# Patient Record
Sex: FEMALE | Race: WHITE
[De-identification: names, ages, dates, MRNs, and addresses within clinical notes are randomized per-mention and may not be internally consistent; named-entity substitution may affect disease eponyms.]

---

## 2020-02-20 ENCOUNTER — HOSPITAL ENCOUNTER (OUTPATIENT)
Dept: HOSPITAL 53 - M SFHCRHEU | Age: 50
End: 2020-02-20
Attending: INTERNAL MEDICINE
Payer: COMMERCIAL

## 2020-02-20 DIAGNOSIS — R53.82: Primary | ICD-10-CM

## 2020-02-20 LAB
25(OH)D3 SERPL-MCNC: 24.3 NG/ML (ref 30–100)
FERRITIN SERPL-MCNC: 58 NG/ML (ref 8–252)
IRON SERPL-MCNC: 71 UG/DL (ref 50–170)
T4 FREE SERPL-MCNC: 0.93 NG/DL (ref 0.76–1.46)
TSH SERPL DL<=0.005 MIU/L-ACNC: 1.22 UIU/ML (ref 0.36–3.74)

## 2020-02-20 PROCEDURE — 82306 VITAMIN D 25 HYDROXY: CPT

## 2020-02-20 PROCEDURE — 84443 ASSAY THYROID STIM HORMONE: CPT

## 2020-02-20 PROCEDURE — 84439 ASSAY OF FREE THYROXINE: CPT

## 2020-02-20 PROCEDURE — 36415 COLL VENOUS BLD VENIPUNCTURE: CPT

## 2020-02-20 PROCEDURE — 83540 ASSAY OF IRON: CPT

## 2020-02-20 PROCEDURE — 82728 ASSAY OF FERRITIN: CPT

## 2020-07-07 ENCOUNTER — HOSPITAL ENCOUNTER (INPATIENT)
Dept: HOSPITAL 53 - M ED | Age: 50
LOS: 10 days | Discharge: HOME HEALTH | DRG: 382 | End: 2020-07-17
Attending: SURGERY | Admitting: SURGERY
Payer: COMMERCIAL

## 2020-07-07 VITALS — SYSTOLIC BLOOD PRESSURE: 132 MMHG | DIASTOLIC BLOOD PRESSURE: 82 MMHG

## 2020-07-07 VITALS — BODY MASS INDEX: 17.86 KG/M2 | WEIGHT: 94.58 LBS | HEIGHT: 61 IN

## 2020-07-07 VITALS — SYSTOLIC BLOOD PRESSURE: 119 MMHG | DIASTOLIC BLOOD PRESSURE: 70 MMHG

## 2020-07-07 DIAGNOSIS — K31.1: Primary | ICD-10-CM

## 2020-07-07 DIAGNOSIS — R63.4: ICD-10-CM

## 2020-07-07 DIAGNOSIS — K26.9: ICD-10-CM

## 2020-07-07 DIAGNOSIS — K25.9: ICD-10-CM

## 2020-07-07 DIAGNOSIS — K31.4: ICD-10-CM

## 2020-07-07 DIAGNOSIS — Z87.891: ICD-10-CM

## 2020-07-07 DIAGNOSIS — Z11.59: ICD-10-CM

## 2020-07-07 LAB
ALBUMIN SERPL BCG-MCNC: 4.2 GM/DL (ref 3.2–5.2)
ALT SERPL W P-5'-P-CCNC: 17 U/L (ref 12–78)
BASOPHILS # BLD AUTO: 0 10^3/UL (ref 0–0.2)
BASOPHILS NFR BLD AUTO: 0.3 % (ref 0–1)
BILIRUB CONJ SERPL-MCNC: 0.1 MG/DL (ref 0–0.2)
BILIRUB SERPL-MCNC: 0.5 MG/DL (ref 0.2–1)
EOSINOPHIL # BLD AUTO: 0 10^3/UL (ref 0–0.5)
EOSINOPHIL NFR BLD AUTO: 0.5 % (ref 0–3)
HCT VFR BLD AUTO: 42.9 % (ref 36–47)
HGB BLD-MCNC: 14.3 G/DL (ref 12–15.5)
LIPASE SERPL-CCNC: 123 U/L (ref 73–393)
LYMPHOCYTES # BLD AUTO: 1.6 10^3/UL (ref 1.5–5)
LYMPHOCYTES NFR BLD AUTO: 24.5 % (ref 24–44)
MAGNESIUM SERPL-MCNC: 2.2 MG/DL (ref 1.8–2.4)
MCH RBC QN AUTO: 30.4 PG (ref 27–33)
MCHC RBC AUTO-ENTMCNC: 33.3 G/DL (ref 32–36.5)
MCV RBC AUTO: 91.1 FL (ref 80–96)
MONOCYTES # BLD AUTO: 0.7 10^3/UL (ref 0–0.8)
MONOCYTES NFR BLD AUTO: 11.4 % (ref 0–5)
NEUTROPHILS # BLD AUTO: 4.1 10^3/UL (ref 1.5–8.5)
NEUTROPHILS NFR BLD AUTO: 63.1 % (ref 36–66)
PLATELET # BLD AUTO: 264 10^3/UL (ref 150–450)
PROT SERPL-MCNC: 8 GM/DL (ref 6.4–8.2)
RBC # BLD AUTO: 4.71 10^6/UL (ref 4–5.4)
TSH SERPL DL<=0.005 MIU/L-ACNC: 1.48 UIU/ML (ref 0.36–3.74)
WBC # BLD AUTO: 6.5 10^3/UL (ref 4–10)

## 2020-07-07 RX ADMIN — SODIUM CHLORIDE, SODIUM LACTATE, POTASSIUM CHLORIDE, CALCIUM CHLORIDE AND DEXTROSE MONOHYDRATE SCH MLS/HR: 5; 600; 310; 30; 20 INJECTION, SOLUTION INTRAVENOUS at 16:42

## 2020-07-07 RX ADMIN — DEXTROSE MONOHYDRATE SCH MG: 50 INJECTION, SOLUTION INTRAVENOUS at 20:43

## 2020-07-07 NOTE — REP
REASON FOR EXAM:  Abdominal pain.

 

PRIORS: None.

 

CONTRAST:  100 mL of Isovue 370.

 

The patient states there was a prior CT of the abdomen and pelvis at [facility], however, secondary to the emergent nature of today's examination,

this examination will be interpreted without the prior for comparison.  If and

when the prior becomes available for review an addendum report will be made

necessary.

 

The lung bases are clear.

 

There is gross gastric distention with predominantly fluid but with gas as well.

The duodenum appears extremely narrowed, however, there is some fluid seen distal

to the narrow portion of the duodenum, which again undergoes luminal narrowing at

the level of the duodenal sweep/fourth portion.  There is gas within multiple

small bowel loops with gas and stool seen throughout the colon.  There is a small

amount of free fluid seen in the periduodenal area without evidence of free

periduodenal air.  No free air is seen throughout the abdomen or pelvis.  The

liver, gallbladder, spleen, pancreas, and adrenal glands and kidneys are within

normal limits.  The abdominal aorta and para-aortic regions are within normal

limits.

 

Bone window technique throughout the examination shows the osseous structures to

be within normal limits.

 

IMPRESSION:

 

1.  There is evidence of at least partial gas outlet obstruction. There is

significant duodenal edema and fluid in the periduodenal area.  In addition, the

aorta/SMA interval is very narrowed and compresses both the duodenal sweep and

the left renal vein.  This could, at least, impart be responsible for the

suspected partial gastric outlet obstruction.  In addition, I cannot rule out the

possibility of a duodenal ulcer responsible for the aforementioned findings as

well.

 

2.  Other findings as described above.

 

 

Electronically Signed by

Mariano Gardner DO 07/07/2020 05:28 P

## 2020-07-08 VITALS — DIASTOLIC BLOOD PRESSURE: 67 MMHG | SYSTOLIC BLOOD PRESSURE: 131 MMHG

## 2020-07-08 VITALS — DIASTOLIC BLOOD PRESSURE: 56 MMHG | SYSTOLIC BLOOD PRESSURE: 115 MMHG

## 2020-07-08 VITALS — DIASTOLIC BLOOD PRESSURE: 68 MMHG | SYSTOLIC BLOOD PRESSURE: 111 MMHG

## 2020-07-08 VITALS — DIASTOLIC BLOOD PRESSURE: 58 MMHG | SYSTOLIC BLOOD PRESSURE: 119 MMHG

## 2020-07-08 VITALS — DIASTOLIC BLOOD PRESSURE: 70 MMHG | SYSTOLIC BLOOD PRESSURE: 118 MMHG

## 2020-07-08 VITALS — SYSTOLIC BLOOD PRESSURE: 124 MMHG | DIASTOLIC BLOOD PRESSURE: 60 MMHG

## 2020-07-08 LAB
BASOPHILS # BLD AUTO: 0 10^3/UL (ref 0–0.2)
BASOPHILS NFR BLD AUTO: 0.5 % (ref 0–1)
BUN SERPL-MCNC: 13 MG/DL (ref 7–18)
CALCIUM SERPL-MCNC: 8.5 MG/DL (ref 8.5–10.1)
CHLORIDE SERPL-SCNC: 103 MEQ/L (ref 98–107)
CO2 SERPL-SCNC: 32 MEQ/L (ref 21–32)
CREAT SERPL-MCNC: 0.58 MG/DL (ref 0.55–1.3)
EOSINOPHIL # BLD AUTO: 0.2 10^3/UL (ref 0–0.5)
EOSINOPHIL NFR BLD AUTO: 3.4 % (ref 0–3)
GFR SERPL CREATININE-BSD FRML MDRD: > 60 ML/MIN/{1.73_M2} (ref 51–?)
GLUCOSE SERPL-MCNC: 110 MG/DL (ref 70–100)
HCT VFR BLD AUTO: 35.6 % (ref 36–47)
HGB BLD-MCNC: 11.8 G/DL (ref 12–15.5)
LYMPHOCYTES # BLD AUTO: 1.6 10^3/UL (ref 1.5–5)
LYMPHOCYTES NFR BLD AUTO: 35.2 % (ref 24–44)
MCH RBC QN AUTO: 30.5 PG (ref 27–33)
MCHC RBC AUTO-ENTMCNC: 33.1 G/DL (ref 32–36.5)
MCV RBC AUTO: 92 FL (ref 80–96)
MONOCYTES # BLD AUTO: 0.5 10^3/UL (ref 0–0.8)
MONOCYTES NFR BLD AUTO: 10.6 % (ref 0–5)
NEUTROPHILS # BLD AUTO: 2.2 10^3/UL (ref 1.5–8.5)
NEUTROPHILS NFR BLD AUTO: 50.1 % (ref 36–66)
PLATELET # BLD AUTO: 216 10^3/UL (ref 150–450)
POTASSIUM SERPL-SCNC: 2.9 MEQ/L (ref 3.5–5.1)
RBC # BLD AUTO: 3.87 10^6/UL (ref 4–5.4)
SODIUM SERPL-SCNC: 140 MEQ/L (ref 136–145)
WBC # BLD AUTO: 4.4 10^3/UL (ref 4–10)

## 2020-07-08 RX ADMIN — SODIUM CHLORIDE, SODIUM LACTATE, POTASSIUM CHLORIDE, CALCIUM CHLORIDE AND DEXTROSE MONOHYDRATE SCH MLS/HR: 5; 600; 310; 30; 20 INJECTION, SOLUTION INTRAVENOUS at 00:21

## 2020-07-08 RX ADMIN — SODIUM CHLORIDE, SODIUM LACTATE, POTASSIUM CHLORIDE, CALCIUM CHLORIDE AND DEXTROSE MONOHYDRATE SCH MLS/HR: 5; 600; 310; 30; 20 INJECTION, SOLUTION INTRAVENOUS at 08:33

## 2020-07-08 RX ADMIN — DEXTROSE MONOHYDRATE SCH MLS/HR: 50 INJECTION, SOLUTION INTRAVENOUS at 16:43

## 2020-07-08 RX ADMIN — DEXTROSE MONOHYDRATE SCH MG: 50 INJECTION, SOLUTION INTRAVENOUS at 08:33

## 2020-07-08 RX ADMIN — DEXTROSE MONOHYDRATE SCH MG: 50 INJECTION, SOLUTION INTRAVENOUS at 21:44

## 2020-07-08 NOTE — REP
CHEST:

 

Two AP views of the chest are performed.

 

There is a nasogastric tube.  The sideport is at the gastroesophageal junction.

The tube should be advanced.

 

Lungs are free of infiltrate.  Heart and mediastinum are unremarkable.

 

 

Electronically Signed by

Darnell Bee MD 07/09/2020 09:12 A

## 2020-07-09 VITALS — SYSTOLIC BLOOD PRESSURE: 113 MMHG | DIASTOLIC BLOOD PRESSURE: 70 MMHG

## 2020-07-09 VITALS — DIASTOLIC BLOOD PRESSURE: 56 MMHG | SYSTOLIC BLOOD PRESSURE: 108 MMHG

## 2020-07-09 VITALS — DIASTOLIC BLOOD PRESSURE: 72 MMHG | SYSTOLIC BLOOD PRESSURE: 152 MMHG

## 2020-07-09 LAB
BUN SERPL-MCNC: 8 MG/DL (ref 7–18)
CALCIUM SERPL-MCNC: 8.6 MG/DL (ref 8.5–10.1)
CHLORIDE SERPL-SCNC: 110 MEQ/L (ref 98–107)
CO2 SERPL-SCNC: 31 MEQ/L (ref 21–32)
CREAT SERPL-MCNC: 0.52 MG/DL (ref 0.55–1.3)
GFR SERPL CREATININE-BSD FRML MDRD: > 60 ML/MIN/{1.73_M2} (ref 51–?)
GLUCOSE SERPL-MCNC: 74 MG/DL (ref 70–100)
POTASSIUM SERPL-SCNC: 3.8 MEQ/L (ref 3.5–5.1)
SODIUM SERPL-SCNC: 144 MEQ/L (ref 136–145)

## 2020-07-09 PROCEDURE — 0DJ08ZZ INSPECTION OF UPPER INTESTINAL TRACT, VIA NATURAL OR ARTIFICIAL OPENING ENDOSCOPIC: ICD-10-PCS | Performed by: SURGERY

## 2020-07-09 PROCEDURE — 02HV33Z INSERTION OF INFUSION DEVICE INTO SUPERIOR VENA CAVA, PERCUTANEOUS APPROACH: ICD-10-PCS | Performed by: RADIOLOGY

## 2020-07-09 PROCEDURE — 3E0436Z INTRODUCTION OF NUTRITIONAL SUBSTANCE INTO CENTRAL VEIN, PERCUTANEOUS APPROACH: ICD-10-PCS | Performed by: SURGERY

## 2020-07-09 RX ADMIN — DEXTROSE MONOHYDRATE SCH MLS/HR: 50 INJECTION, SOLUTION INTRAVENOUS at 05:10

## 2020-07-09 RX ADMIN — SODIUM CHLORIDE SCH ML: 9 INJECTION, SOLUTION INTRAMUSCULAR; INTRAVENOUS; SUBCUTANEOUS at 19:03

## 2020-07-09 RX ADMIN — DEXTROSE MONOHYDRATE SCH MLS/HR: 50 INJECTION, SOLUTION INTRAVENOUS at 16:10

## 2020-07-09 RX ADMIN — Medication PRN UNITS: at 21:08

## 2020-07-09 RX ADMIN — SODIUM CHLORIDE PRN ML: 9 INJECTION, SOLUTION INTRAMUSCULAR; INTRAVENOUS; SUBCUTANEOUS at 21:08

## 2020-07-09 RX ADMIN — DEXTROSE MONOHYDRATE SCH MG: 50 INJECTION, SOLUTION INTRAVENOUS at 08:39

## 2020-07-09 RX ADMIN — Medication SCH UNITS: at 19:03

## 2020-07-09 RX ADMIN — DEXTROSE MONOHYDRATE SCH MG: 50 INJECTION, SOLUTION INTRAVENOUS at 21:08

## 2020-07-09 NOTE — HPE
DATE OF ADMISSION:  07/07/2020

 

ADMITTING DIAGNOSIS:  Gastric outlet obstruction.

 

HISTORY OF THE PRESENT ILLNESS:  The patient is a very pleasant 50-year-old 
woman

who presented to the emergency department at 8:54 a.m. on 07/07/2020 complaining

of persistent nausea and vomiting with an inability to take oral intake and loss

of weight.  She reported that she had been having problems, particularly since

March of 2020.  She does report that she had some problems similar in nature 
back

in 2016.  She reports that since March, her problems have worsened.  She has had

progressively worsening difficulty with food coming back up after meals.  She

reports crampy abdominal pains.  She has been having some bowel function, but

this is diminished.  She reports that she was seen at [facility] on

06/08/2020 with complaints of dizziness and weakness.  She was then seen again 
at

[facility] on 06/27/2020.  She reports that in February, she weighed

102 pounds, and her weight is currently down to about 88 pounds.  She reports

that she will eat, and then she will have this sensation of bubbling or churning

in the abdomen with cramps, and eventually she will vomit back up most if not 
all

of what she has eaten.  She had been taking some over-the-counter Pepcid.  She

was recently provided some Reglan and Carafate by either the [facility]

[facility] or her primary physician, Dr. Arango.  She reports that she had been

requesting a referral to gastroenterology, but this had not yet been

accomplished.  In the emergency department at [facility], she was evaluated with

some laboratory work and also had a CT scan of the abdomen and pelvis obtained.

The CT scan revealed a distended and enlarged stomach.  The radiologist reported

evidence for narrowing of the duodenum.  He did report that there was some gas

within some small bowel loops and stool in the colon.  The patient had a

nasogastric tube inserted in the emergency department, and she is now admitted

for further evaluation and management of her apparent gastric outlet

obstruction.

 

ALLERGIES:  The patient reports no known drug allergies..

 

MEDICATIONS:  Her current listed medications include only some acetaminophen on

an as-needed basis.  She had been provided previously with some Reglan and

promethazine and Carafate but indicates that at the time of her admission, she

has not actually been taking these.  She had also occasionally taken some

over-the-counter Pepcid.

 

SURGICAL HISTORY:  Is significant for a tubal ligation back in 1997.

 

MEDICAL HISTORY:  The patient had undergone evaluation for some positive

rheumatologic tests in February.  Apparently, the tests pointed towards

scleroderma, but the rheumatologist did not feel this was the case.  She has no

other active medical issues beyond her gastrointestinal (GI) problem.

 

SOCIAL HISTORY:  The patient has been helping care for her mother.  She is

 and is a  dependent.  She is a former smoker and reports that 
she

quit any tobacco use a month ago.  She denies any alcohol intake.

 

REVIEW OF SYSTEMS:  Shows no history of chest pain or palpitations.  She has no

respiratory symptoms.  She has no history of deep venous thrombosis (DVT) or

pulmonary embolus.  She denies any dysuria or hematuria or renal problems.  She

has no bone or joint issues.  She has not noticed any rectal bleeding or

hematemesis.  She denies any history of hepatitis, pancreatitis, or jaundice.

 

FAMILY HISTORY:  The patient reports that her mother has had stomach issues,

though these are not defined.

 

PHYSICAL EXAMINATION:

Reveals a very thin pleasant woman sitting up in the stretcher.

She is alert and oriented.

Most recent vital signs are reviewed.

She is not tachycardiac, and her blood pressure is good.

The patient does become emotional when describing her persistent problems and 
her

inability so far to have obtained an answer to why she is having so many

problems.

Skin:  Is warm and dry.

Sclerae are anicteric.  Mucous membranes are moist.

The neck is supple without mass.  She has no supraclavicular adenopathy.

Heart examination shows a regular rate and rhythm.

The lungs are clear.

The abdomen is flat to perhaps mildly protuberant.  She does have bowel sounds

present.  She has some mild tenderness in the right upper quadrant area.  There

is no mass appreciated.  There is no hernia identified.

Extremities are thin with palpable radial and pedal pulses and no edema.

 

LABORATORY STUDIES:

Include a complete blood count (CBC) showing a white count of 6, hemoglobin of

14, hematocrit 43, and platelet count of 264,000.  Differential count shows 63%

neutrophils, 24% lymphocytes, and 11% monocytes.

 

Chemistry profile included a point of care chemistry profile showing a glucose 
of

113, potassium 2.9, chloride of 89, with a BUN of 24, and a creatinine of 0.9.

Her liver function tests were all normal with a normal magnesium.  Albumin is 
4.2

with a protein of 8, and her lipase is normal at 123.  She had a TSH level done

that was 1.48.

 

Her CT scan images and report I reviewed personally.  She definitely has a

markedly enlarged stomach.  The course of the duodenum past the duodenal bulb is

not entirely clear to me on reviewing her CAT scan, as this is not outlined by

contrast or air.  She does have some air and fluid more distally in the small

bowel.

 

IMPRESSION:  Is gastric outlet obstruction with 3-4 months of progressive

symptoms.  She has had a weight loss of approximately 15 pounds over this period

of time.

 

PLAN:  The patient was counseled that she clearly has a blockage of the outlet 
of

her stomach, and this very nicely explains her symptoms.  I advised her that the

probably most common cause for a gastric outlet obstruction would be severe

peptic ulcer disease.  It is also possible that this could be caused by a

malignant lesion, either of the stomach or duodenum or pancreas, although there

was no mass identified by CT.  I have recommended that we admit her to treat her

for her inability to tolerate any oral intake at this point but also try to

identify the cause of her obstruction.  The NG tube will be continued to low

intermittent suction at this point.  I will continue her on some IV fluids.  If

it is clear that her obstruction persists despite treatment, then initiation of

total parenteral nutrition would be appropriate.  I advised her that we will 
need

to perform an upper endoscopy within the next day or two to try to diagnose the

etiology of her obstruction.  She will be started on Protonix 40 mg IV twice

daily.  She does not require any antibiotics at this time.  We will recheck her

laboratories in the morning.  She had an opportunity to ask questions.  She is

agreeable with the plan as I have outlined it.

[facility]

## 2020-07-09 NOTE — REPVR
PROCEDURE INFORMATION: 

Exam: MR Abdomen Without and With Contrast 

Exam date and time: 7/9/2020 6:36 PM 

Age: 50 years old 

Clinical indication: Abdominal tenderness and bloating and constipation and 

mass, lump, or swelling and nausea and vomiting; Epigastric; Patient HX: Pain 

nausea vomiting diarrhea, attn pancreas; Additional info: Gastric outlet 

obstruction, evaluate for tumor 



TECHNIQUE: 

Imaging protocol: MR of the abdomen without and with intravenous contrast. 

Contrast material: PROHANCE; Contrast volume: 8 ml; Contrast route: INTRAVENOUS 

(IV);  



COMPARISON: 

CT ABD/PEL W/IV CONTRAST ONLY 7/7/2020 10:38 AM 



FINDINGS: 

Liver: The liver is normal. 

Gallbladder and bile ducts: The gallbladder is normal.No calcified calculi. 

Normal bile ducts. 

Pancreas: The pancreas is normal. 

Spleen: The spleen is normal. 

Adrenals: The adrenals are normal. 

Kidneys and ureters: The kidneys are normal.No hydronephrosis. 

Stomach and bowel: The stomach is distended and contains debris and fluid. 

There is a 4 cm length of the descending duodenum which is persistently 

circumferentially thick-walled. Series 301 images 10-12 series 501 images 

19-21. Also seen on series 602, images 18 to 21. 

Intraperitoneal space: Unremarkable

 Arteries: No abdominal aortic aneurysm. 

Bones/joints: Unremarkable for

Soft tissues: Unremarkable. 



IMPRESSION: 

Gastric distension. The descending duodenum appears persistently thick-walled 

on multiple sequences. This could represent inflammation or neoplasm. 



Electronically signed by: Memo Chris On 07/09/2020  20:58:07 PM

## 2020-07-09 NOTE — IPN
DATE OF SERVICE:  07/08/2020

 

HISTORY:  The patient was admitted yesterday with a several-month history 
leading

up to a gastric outlet obstruction diagnosis.  She has been unable to keep down

any food for quite some time and has lost 13-18 pounds.  She was admitted with a

nasogastric (NG) tube and intravenous (IV) hydration.  She initially drained a

fairly large amount of fluid from the tube but overnight has had minimal output.

She reports she has had a small amount of flatus, and she denies any abdominal

pain currently.

 

VITAL SIGNS:  Show that she has been afebrile.  Her pulse is in the low to mid

70s, and blood pressure is good.

 

INTAKE AND OUTPUT:  Showed that she has had a good urine output this morning.

Her NG tube has had only 25 mL recorded out today.

 

PHYSICAL EXAMINATION:

The patient is sitting up in the bed dabbing at her nose because of some 
drainage

around the NG tube.  This appears to be just local irritation.

She is alert and oriented and does complain of some throat discomfort from the 
NG

tube.

Heart examination shows a regular rhythm.

Her lungs are clear.

The abdomen shows active bowel sounds and is soft and without any tenderness.

 

LABORATORY STUDIES:

Today show a white count of 4000, hemoglobin 12, hematocrit 36, and a platelet

count of 216,000.  Differential count is normal.

 

Her chemistry profile shows a potassium of 2.9, which is unchanged from

yesterday.  Her other electrolytes are normal.

 

IMPRESSION:  The patient has a history and CT scan consistent with gastric 
outlet

obstruction.  The etiology is unknown.  The CT scan showed no evidence of mass.

 

PLAN:  The patient will be continued on her twice-daily Protonix IV.  Her NG 
tube

will be discontinued, as it has had minimal out.  She will remain nothing by

mouth.  She is scheduled for an upper endoscopy tomorrow to try to diagnose the

etiology of her obstruction.  Additional potassium will be placed in her IV

fluid, and the rate will be decreased.  A COVID-19 test will be obtained

preoperatively.

YONATHAND

## 2020-07-09 NOTE — ROOR
________________________________________________________________________________

Patient Name: Sade Sebastian             Procedure Date: 7/9/2020 10:11 AM

MRN: [id number]                          Account Number: [id number]

YOB: 1970                Age: 50

Room: Main OR                          Gender: Female

Note Status: Finalized                 

________________________________________________________________________________

 

Procedure:           Upper GI endoscopy

Indications:         Gastric outlet obstruction

Providers:           Morales Jameson MD

Referring MD:        2. Inpatient 2. Inpatient

Requesting Provider: 

Medicines:           Monitored Anesthesia Care

Complications:       No immediate complications.

________________________________________________________________________________

Procedure:           Pre-Anesthesia Assessment:

                     - Prior to the procedure, a History and Physical was 

                     performed, and patient medications and allergies were 

                     reviewed. The patient is competent. The risks and 

                     benefits of the procedure and the sedation options and 

                     risks were discussed with the patient. All questions were 

                     answered and informed consent was obtained. Patient 

                     identification and proposed procedure were verified by 

                     the physician, the nurse and the anesthetist in the 

                     procedure room. Mental Status Examination: alert and 

                     oriented. Airway Examination: normal oropharyngeal airway 

                     and neck mobility. Prophylactic Antibiotics: The patient 

                     does not require prophylactic antibiotics. Prior 

                     Anticoagulants: The patient has taken no previous 

                     anticoagulant or antiplatelet agents. ASA Grade 

                     Assessment: II - A patient with mild systemic disease. 

                     After reviewing the risks and benefits, the patient was 

                     deemed in satisfactory condition to undergo the 

                     procedure. The anesthesia plan was to use monitored 

                     anesthesia care (MAC). Immediately prior to 

                     administration of medications, the patient was 

                     re-assessed for adequacy to receive sedatives. The heart 

                     rate, respiratory rate, oxygen saturations, blood 

                     pressure, adequacy of pulmonary ventilation, and response 

                     to care were monitored throughout the procedure. The 

                     physical status of the patient was re-assessed after the 

                     procedure.

                     The Endoscope was introduced through the mouth, and 

                     advanced to the pylorus. The upper GI endoscopy was 

                     accomplished without difficulty. The patient tolerated 

                     the procedure well.

                                                                                

Findings:

     The examined esophagus was normal.

     A medium amount of food (residue) was found on the greater curvature of 

     the stomach.

     A benign-appearing, intrinsic severe stenosis was found at the pylorus. 

     This was non-traversed. There appeared to be a pinpint opening. The 

     mucosa extending to thie point appeared normal.

     One non-bleeding linear gastric ulcer with no stigmata of bleeding was 

     found in the prepyloric region of the stomach. The lesion was 7 mm in 

     largest dimension. There was some deformity of this area.

                                                                                

Impression:          - Normal esophagus.

                     - A medium amount of food (residue) in the stomach.

                     - Gastric stenosis was found at the pylorus.

                     - Non-bleeding gastric ulcer with no stigmata of bleeding.

                     - No specimens collected.

Recommendation:      - Return patient to [facility] for ongoing care.

                     - NPO.

                     - Perform magnetic resonance imaging (MRI) with 

                     gadolinium at appointment to be scheduled.

                                                                                

 

Morales Jameson MD

__________________

Morales Jameson MD

7/9/2020 12:31:07 PM

Electronically signed by Morales Jameson MD

Number of Addenda: 0

 

Note Initiated On: 7/9/2020 10:11 AM

Estimated Blood Loss:

     Estimated blood loss: none.

## 2020-07-10 VITALS — DIASTOLIC BLOOD PRESSURE: 64 MMHG | SYSTOLIC BLOOD PRESSURE: 127 MMHG

## 2020-07-10 VITALS — SYSTOLIC BLOOD PRESSURE: 129 MMHG | DIASTOLIC BLOOD PRESSURE: 81 MMHG

## 2020-07-10 VITALS — DIASTOLIC BLOOD PRESSURE: 83 MMHG | SYSTOLIC BLOOD PRESSURE: 124 MMHG

## 2020-07-10 VITALS — SYSTOLIC BLOOD PRESSURE: 126 MMHG | DIASTOLIC BLOOD PRESSURE: 80 MMHG

## 2020-07-10 VITALS — SYSTOLIC BLOOD PRESSURE: 132 MMHG | DIASTOLIC BLOOD PRESSURE: 85 MMHG

## 2020-07-10 VITALS — SYSTOLIC BLOOD PRESSURE: 122 MMHG | DIASTOLIC BLOOD PRESSURE: 83 MMHG

## 2020-07-10 LAB — CANCER AG19-9 SERPL-ACNC: 8.9 U/ML (ref ?–35)

## 2020-07-10 RX ADMIN — SODIUM CHLORIDE SCH ML: 9 INJECTION, SOLUTION INTRAMUSCULAR; INTRAVENOUS; SUBCUTANEOUS at 19:03

## 2020-07-10 RX ADMIN — Medication PRN UNITS: at 08:53

## 2020-07-10 RX ADMIN — SODIUM CHLORIDE PRN ML: 9 INJECTION, SOLUTION INTRAMUSCULAR; INTRAVENOUS; SUBCUTANEOUS at 08:53

## 2020-07-10 RX ADMIN — Medication SCH UNITS: at 05:43

## 2020-07-10 RX ADMIN — SODIUM CHLORIDE SCH ML: 9 INJECTION, SOLUTION INTRAMUSCULAR; INTRAVENOUS; SUBCUTANEOUS at 05:43

## 2020-07-10 RX ADMIN — Medication SCH UNITS: at 19:03

## 2020-07-10 RX ADMIN — DEXTROSE MONOHYDRATE SCH MG: 50 INJECTION, SOLUTION INTRAVENOUS at 08:44

## 2020-07-10 RX ADMIN — DEXTROSE MONOHYDRATE SCH MG: 50 INJECTION, SOLUTION INTRAVENOUS at 20:13

## 2020-07-10 NOTE — IPN
DATE: 07/09/2020

 

HISTORY:

The patient was admitted on July 7th with a history and CT scan consistent with

gastric outlet obstruction.  She had an upper endoscopy earlier today which 
shows

perhaps a pinpoint opening at the pylorus with evidence of some deformity and an

ulcer just proximal to the pinpoint opening seen.  She had some retained food

products in the stomach.

 

Vital signs show that she has remained afebrile over the last 24 hours.  Her

pulse is in the 60s to low 80s.  Blood pressure is good and her room air

oxygenation is normal.

 

Intake and output show that she had 1230 recorded in yesterday, though this is

less than what would be expected from her IV fluids.  Her urine output yesterday

was 550 and her urine output has increased today.

 

PHYSICAL EXAMINATION:

The patient has been alert and oriented.  She tolerated her upper endoscopy

well.

Heart exam shows a regular rate and rhythm.

The lungs are clear.

The abdomen is flat.  She does have active bowel sounds.  The abdomen is soft 
and

without significant tenderness.

 

LABORATORY FINDINGS:

The patient had a med profile after her endoscopy and this shows a sodium of 
144,

potassium 3.8, chloride 110, CO2 of 31, BUN of 8, creatinine 0.5 and a glucose 
of

74.  She has a CA 19-9 and a gastrin level that are both pending.

 

IMPRESSION:

Gastric outlet obstruction likely secondary to longstanding ulcer disease, 
though

tumor has not been definitively ruled out.

 

PLAN:

The patient will have a peripherally inserted central catheter (PICC) line 
placed

and she will be started on intravenous nutrition.  An MRI will be obtained to

further evaluate the pancreas and duodenum to look for any evidence of

malignancy.  The gastrin and CA 19-9 were ordered.  I anticipate she will come 
to

surgery based on her gastric outlet obstruction.

RICHARD

## 2020-07-11 VITALS — SYSTOLIC BLOOD PRESSURE: 119 MMHG | DIASTOLIC BLOOD PRESSURE: 81 MMHG

## 2020-07-11 VITALS — SYSTOLIC BLOOD PRESSURE: 115 MMHG | DIASTOLIC BLOOD PRESSURE: 74 MMHG

## 2020-07-11 VITALS — DIASTOLIC BLOOD PRESSURE: 68 MMHG | SYSTOLIC BLOOD PRESSURE: 118 MMHG

## 2020-07-11 VITALS — SYSTOLIC BLOOD PRESSURE: 113 MMHG | DIASTOLIC BLOOD PRESSURE: 70 MMHG

## 2020-07-11 VITALS — DIASTOLIC BLOOD PRESSURE: 70 MMHG | SYSTOLIC BLOOD PRESSURE: 112 MMHG

## 2020-07-11 VITALS — DIASTOLIC BLOOD PRESSURE: 80 MMHG | SYSTOLIC BLOOD PRESSURE: 116 MMHG

## 2020-07-11 LAB
ALBUMIN SERPL BCG-MCNC: 3.1 GM/DL (ref 3.2–5.2)
ALT SERPL W P-5'-P-CCNC: 15 U/L (ref 12–78)
BILIRUB SERPL-MCNC: 0.4 MG/DL (ref 0.2–1)
BUN SERPL-MCNC: 13 MG/DL (ref 7–18)
CALCIUM SERPL-MCNC: 8.4 MG/DL (ref 8.5–10.1)
CHLORIDE SERPL-SCNC: 107 MEQ/L (ref 98–107)
CO2 SERPL-SCNC: 28 MEQ/L (ref 21–32)
CREAT SERPL-MCNC: 0.48 MG/DL (ref 0.55–1.3)
GFR SERPL CREATININE-BSD FRML MDRD: > 60 ML/MIN/{1.73_M2} (ref 51–?)
GLUCOSE SERPL-MCNC: 97 MG/DL (ref 70–100)
POTASSIUM SERPL-SCNC: 3.6 MEQ/L (ref 3.5–5.1)
PROT SERPL-MCNC: 6.2 GM/DL (ref 6.4–8.2)
SODIUM SERPL-SCNC: 142 MEQ/L (ref 136–145)

## 2020-07-11 RX ADMIN — DEXTROSE MONOHYDRATE SCH MG: 50 INJECTION, SOLUTION INTRAVENOUS at 19:58

## 2020-07-11 RX ADMIN — SODIUM CHLORIDE SCH ML: 9 INJECTION, SOLUTION INTRAMUSCULAR; INTRAVENOUS; SUBCUTANEOUS at 17:51

## 2020-07-11 RX ADMIN — Medication PRN UNITS: at 08:15

## 2020-07-11 RX ADMIN — SODIUM CHLORIDE SCH ML: 9 INJECTION, SOLUTION INTRAMUSCULAR; INTRAVENOUS; SUBCUTANEOUS at 05:43

## 2020-07-11 RX ADMIN — DEXTROSE MONOHYDRATE SCH MG: 50 INJECTION, SOLUTION INTRAVENOUS at 08:14

## 2020-07-11 RX ADMIN — Medication SCH UNITS: at 05:43

## 2020-07-11 RX ADMIN — SODIUM CHLORIDE PRN ML: 9 INJECTION, SOLUTION INTRAMUSCULAR; INTRAVENOUS; SUBCUTANEOUS at 08:15

## 2020-07-11 RX ADMIN — Medication SCH UNITS: at 17:51

## 2020-07-11 NOTE — IPN
DATE:  07/10/2020

 

HISTORY:  The patient was admitted on 07/07/2020 with evidence for a gastric

outlet obstruction.  Her esophagogastroduodenoscopy (EGD) showed a pinhole

opening perhaps into the duodenum.  She had a followup MRI, which reported no

evidence of pancreatic mass.  She was seen to have a distended stomach with a 4

cm length of the second portion of the duodenum persistently circumferentially

thick-walled.  There was no concerning adenopathy.  The patient has been kept

nothing by mouth and has not had any emesis or complaints of abdominal pain.  
She

was started on total parenteral nutrition (TPN) yesterday as well.

 

Vital signs show that she has been afebrile.  Her pulse has been in the 60s to

about 90.  Blood pressure is good and her oxygen saturations are normal.

 

Intake and output shows that yesterday she had 1300 recorded in with 1000 of

urine out.

 

PHYSICAL EXAMINATION:  The patient is sitting upright in her bed looking quite

comfortable.  She is actually fairly cheerful.  Abdomen is thin and flat.  She

has positive bowel sounds.

 

Laboratory studies from yesterday included a CA 19-9 that was normal at 8.9.  A

gastrin level was drawn and is pending.

 

IMPRESSION:

The patient has a gastric outlet obstruction, which I suspect strongly is 
related

to longstanding peptic ulcer disease.

 

PLAN:

She will continue TPN and proton pump inhibitors twice daily.  She will be kept

nothing by mouth as she appears to be tolerating her own secretions at least.

She does report some flatus.  I have recommended that on Monday we obtain a

gastrograffin swallow to see if we can document some flow through her duodenum,

which might suggest a slight improvement.  If she has not improved and the

swallow shows no significant passage of contrast, then I think surgery on

Tuesday, 07/14/2020, would be warranted.

[facility]

## 2020-07-12 VITALS — SYSTOLIC BLOOD PRESSURE: 107 MMHG | DIASTOLIC BLOOD PRESSURE: 73 MMHG

## 2020-07-12 VITALS — DIASTOLIC BLOOD PRESSURE: 73 MMHG | SYSTOLIC BLOOD PRESSURE: 108 MMHG

## 2020-07-12 VITALS — SYSTOLIC BLOOD PRESSURE: 108 MMHG | DIASTOLIC BLOOD PRESSURE: 73 MMHG

## 2020-07-12 VITALS — SYSTOLIC BLOOD PRESSURE: 108 MMHG | DIASTOLIC BLOOD PRESSURE: 67 MMHG

## 2020-07-12 VITALS — SYSTOLIC BLOOD PRESSURE: 105 MMHG | DIASTOLIC BLOOD PRESSURE: 67 MMHG

## 2020-07-12 VITALS — DIASTOLIC BLOOD PRESSURE: 74 MMHG | SYSTOLIC BLOOD PRESSURE: 109 MMHG

## 2020-07-12 RX ADMIN — Medication PRN UNITS: at 08:03

## 2020-07-12 RX ADMIN — SODIUM CHLORIDE SCH ML: 9 INJECTION, SOLUTION INTRAMUSCULAR; INTRAVENOUS; SUBCUTANEOUS at 06:08

## 2020-07-12 RX ADMIN — Medication PRN UNITS: at 20:19

## 2020-07-12 RX ADMIN — SODIUM CHLORIDE PRN ML: 9 INJECTION, SOLUTION INTRAMUSCULAR; INTRAVENOUS; SUBCUTANEOUS at 20:19

## 2020-07-12 RX ADMIN — Medication SCH UNITS: at 18:26

## 2020-07-12 RX ADMIN — DEXTROSE MONOHYDRATE SCH MG: 50 INJECTION, SOLUTION INTRAVENOUS at 08:02

## 2020-07-12 RX ADMIN — Medication SCH UNITS: at 06:08

## 2020-07-12 RX ADMIN — DEXTROSE MONOHYDRATE SCH MG: 50 INJECTION, SOLUTION INTRAVENOUS at 20:18

## 2020-07-12 RX ADMIN — SODIUM CHLORIDE PRN ML: 9 INJECTION, SOLUTION INTRAMUSCULAR; INTRAVENOUS; SUBCUTANEOUS at 08:03

## 2020-07-12 RX ADMIN — SODIUM CHLORIDE SCH ML: 9 INJECTION, SOLUTION INTRAMUSCULAR; INTRAVENOUS; SUBCUTANEOUS at 18:26

## 2020-07-12 NOTE — IPN
DATE:  07/12/2020

 

 

 

HISTORY:  The patient is being treated for gastric outlet obstruction.  She had

presented on the 7th with several months of worsening crampy abdominal pain with

postprandial vomiting and weight loss.  She is currently on total parenteral

nutrition and twice daily IV Protonix.  She is tolerating her own secretions with

no vomiting or abdominal pain while nothing by mouth.

 

Vital signs show that her temperature has been afebrile.  Her pulse is in the 80s

generally and her blood pressure is good.

 

Intake and output show that yesterday she had 1920 recorded in with [id number]

recorded out.

 

PHYSICAL EXAMINATION:  The patient is alert and very pleasant.  She denies any

pain.  Skin is warm and dry.  Heart exam shows a regular rhythm.  The abdomen is

thin and flat and without any tenderness.  She has not had any further labs

today.

 

IMPRESSION:  The patient is doing well and tolerating her own secretions while

nothing by mouth.

 

PLAN:  We will obtain a gastrograph and swallow tomorrow morning to try to better

estimate whether she might tolerate a clear liquid diet or not.  If this shows

persistent obstruction then surgical intervention is tentatively scheduled for

Tuesday the 14th.  If there is some flow into the duodenum then repeat endoscopy

and consideration of dilation may be appropriate.

## 2020-07-12 NOTE — IPN
DATE:  07/11/2020

 

HISTORY:  The patient was admitted on 07/07/2020 with a gastric outlet

obstruction.  Upper endoscopy revealed a pinpoint opening out of the stomach with

some superficial ulceration in the distal stomach or pylorus.  An MRI showed no

evidence of mass, but persistent narrowing of the descending duodenum.  A CA 19-9

is normal and her gastrin level ordered on 07/09/2020 has not returned elevated

at 175.  She is currently nothing by mouth and on total parenteral nutrition.

She reports that she feels fine with no pain at all and she has had no nausea or

vomiting while nothing by mouth.

 

Vital signs show that she is afebrile with a pulse in the 80s and a normal blood

pressure.  Room air oxygen saturation is normal.

 

Intake and output shows that her total parenteral nutrition intake is not

recorded.  Her urine output was brisk at 3100 mL on 07/10/2020.  She does report

passage of flatus but has not had a bowel movement.

 

PHYSICAL EXAMINATION:  The patient is actually quite perky and pleasant today.

Heart and lung exam is unremarkable.  The abdomen is thin and flat.  She has

active bowel sounds.  There is no tenderness.

 

LABORATORY STUDIES:  The patient had a chemistry profile today.  Her sodium was

142 with a potassium 3.6.  Chloride 107, CO2 is 28, BUN 13, creatinine 0.48, and

a glucose of 97.  Her total protein is 6.2 with an albumin of 3.1.  As noted, her

gastrin was 175 with a normal reported range of zero to 115.

 

IMPRESSION:  The patient has a gastric outlet obstruction at the level of the

duodenum.  She has had several months of symptoms culminating her admission.  Her

CT and MRI do not show any pancreatic pathology, only thickening of her duodenum.

Interestingly, she is tolerating her own secretions on twice daily Protonix

without the need of a nasogastric (NG) tube.

 

PLAN:  The patient will have a gastrograph swallow on Monday morning to see if we

can document some flow out of the stomach.  If this is the case, then attempting

a repeat endoscopy possibly with dilation may be appropriate.  If flow is minimal

or cannot be documented, then proceeding with surgery I think would be the next

reasonable step.  The patient was counseled regarding this plan and is agreeable.

## 2020-07-12 NOTE — REP
PICC line insertion under ultrasound guidance.

 

The procedure was performed by BUNNY Arshad, under the direct

supervision of Dr. Bee.

 

The risks and benefits of the procedure were explained to the patient and

informed consent was obtained both verbally and written. Directly prior to the

start of the procedure, a formal timeout was completed in the procedure room.

 

The right medial brachial vein was localized using ultrasound guidance.  The skin

was prepped and draped in the sterile fashion. Using ultrasound guidance the

right medial brachial vein was cannulated and a 0.018 guidewire was inserted and

advanced to the SVC using fluoroscopic guidance.  The needle was removed and a

5.5 Bhutanese dilator and peel-away sheath was inserted over the guidewire.  A 5.5

Bhutanese dual lumen catheter was cut to the length of 30 cm.  The dilator was

removed and the catheter was inserted over the guide wire with the tip ending in

the SVC.  The peel-away sheath was removed and the catheter was flushed with

heparinized saline as per hospital protocol.  The catheter was affixed to the

skin and a sterile dressing was applied.

 

The patient tolerated the procedure well and there were no immediate

complications.

 

0.3 minutes of fluoroscopy  time was utilized for this procedure. Some

fluoroscopic images are performed with last image hold technology.  These images

require no additional radiation.

 

 

Reviewed by

BUNNY Cohen 07/10/2020 07:37 A

Electronically Signed by

Darnell Bee MD 07/12/2020 11:37 P

## 2020-07-13 VITALS — SYSTOLIC BLOOD PRESSURE: 104 MMHG | DIASTOLIC BLOOD PRESSURE: 71 MMHG

## 2020-07-13 VITALS — DIASTOLIC BLOOD PRESSURE: 73 MMHG | SYSTOLIC BLOOD PRESSURE: 114 MMHG

## 2020-07-13 VITALS — DIASTOLIC BLOOD PRESSURE: 71 MMHG | SYSTOLIC BLOOD PRESSURE: 105 MMHG

## 2020-07-13 VITALS — SYSTOLIC BLOOD PRESSURE: 103 MMHG | DIASTOLIC BLOOD PRESSURE: 70 MMHG

## 2020-07-13 RX ADMIN — Medication SCH UNITS: at 18:57

## 2020-07-13 RX ADMIN — DEXTROSE MONOHYDRATE SCH MG: 50 INJECTION, SOLUTION INTRAVENOUS at 20:29

## 2020-07-13 RX ADMIN — Medication SCH UNITS: at 05:36

## 2020-07-13 RX ADMIN — SODIUM CHLORIDE PRN ML: 9 INJECTION, SOLUTION INTRAMUSCULAR; INTRAVENOUS; SUBCUTANEOUS at 20:32

## 2020-07-13 RX ADMIN — DEXTROSE MONOHYDRATE SCH MG: 50 INJECTION, SOLUTION INTRAVENOUS at 08:02

## 2020-07-13 RX ADMIN — SODIUM CHLORIDE SCH ML: 9 INJECTION, SOLUTION INTRAMUSCULAR; INTRAVENOUS; SUBCUTANEOUS at 05:36

## 2020-07-13 RX ADMIN — Medication PRN UNITS: at 20:32

## 2020-07-13 RX ADMIN — SODIUM CHLORIDE SCH ML: 9 INJECTION, SOLUTION INTRAMUSCULAR; INTRAVENOUS; SUBCUTANEOUS at 18:56

## 2020-07-13 NOTE — REPVR
PROCEDURE INFORMATION: 

Exam: XR Chest, 1 View 

Exam date and time: 7/13/2020 8:59 PM 

Age: 50 years old 

Clinical indication: Device placement; Ng tube; Additional info: Check 

placement of newly dropped ng 



TECHNIQUE: 

Imaging protocol: XR of the chest 

Views: 1 view. 



COMPARISON: 

CR Chest, 1 view 7/7/2020 2:28 PM 



FINDINGS: 

Tubes, catheters and devices: NG tube courses through the mediastinum into the 

left upper quadrant. Inferior tip not visualized although likely located within 

the gastric body. PICC line enters from the right with the tip at the superior 

cavoatrial junction.

Lungs: Unremarkable. No consolidation. 

Pleural space: Unremarkable. No pleural effusion. No pneumothorax. 

Heart/Mediastinum: Unremarkable. No cardiomegaly. 

Bones/joints: Unremarkable. 



IMPRESSION: 

NG tube courses through the mediastinum into the left upper quadrant. Inferior 

tip not visualized although likely located within the gastric body. 



Electronically signed by: Jose Rudolph On 07/13/2020  21:59:16 PM

## 2020-07-14 VITALS — SYSTOLIC BLOOD PRESSURE: 105 MMHG | DIASTOLIC BLOOD PRESSURE: 71 MMHG

## 2020-07-14 VITALS — DIASTOLIC BLOOD PRESSURE: 77 MMHG | SYSTOLIC BLOOD PRESSURE: 108 MMHG

## 2020-07-14 VITALS — DIASTOLIC BLOOD PRESSURE: 65 MMHG | SYSTOLIC BLOOD PRESSURE: 104 MMHG

## 2020-07-14 VITALS — SYSTOLIC BLOOD PRESSURE: 105 MMHG | DIASTOLIC BLOOD PRESSURE: 67 MMHG

## 2020-07-14 VITALS — DIASTOLIC BLOOD PRESSURE: 83 MMHG | SYSTOLIC BLOOD PRESSURE: 125 MMHG

## 2020-07-14 VITALS — SYSTOLIC BLOOD PRESSURE: 110 MMHG | DIASTOLIC BLOOD PRESSURE: 75 MMHG

## 2020-07-14 LAB
ALBUMIN SERPL BCG-MCNC: 3.1 GM/DL (ref 3.2–5.2)
ALT SERPL W P-5'-P-CCNC: 70 U/L (ref 12–78)
BASOPHILS # BLD AUTO: 0 10^3/UL (ref 0–0.2)
BASOPHILS NFR BLD AUTO: 0.5 % (ref 0–1)
BILIRUB SERPL-MCNC: 0.2 MG/DL (ref 0.2–1)
BUN SERPL-MCNC: 25 MG/DL (ref 7–18)
CALCIUM SERPL-MCNC: 8.6 MG/DL (ref 8.5–10.1)
CHLORIDE SERPL-SCNC: 109 MEQ/L (ref 98–107)
CO2 SERPL-SCNC: 30 MEQ/L (ref 21–32)
CREAT SERPL-MCNC: 0.56 MG/DL (ref 0.55–1.3)
EOSINOPHIL # BLD AUTO: 0.2 10^3/UL (ref 0–0.5)
EOSINOPHIL NFR BLD AUTO: 5.7 % (ref 0–3)
GFR SERPL CREATININE-BSD FRML MDRD: > 60 ML/MIN/{1.73_M2} (ref 51–?)
GLUCOSE SERPL-MCNC: 111 MG/DL (ref 70–100)
HCT VFR BLD AUTO: 38.3 % (ref 36–47)
HGB BLD-MCNC: 12.3 G/DL (ref 12–15.5)
LYMPHOCYTES # BLD AUTO: 1.1 10^3/UL (ref 1.5–5)
LYMPHOCYTES NFR BLD AUTO: 28.6 % (ref 24–44)
MCH RBC QN AUTO: 29.6 PG (ref 27–33)
MCHC RBC AUTO-ENTMCNC: 32.1 G/DL (ref 32–36.5)
MCV RBC AUTO: 92.3 FL (ref 80–96)
MONOCYTES # BLD AUTO: 0.2 10^3/UL (ref 0–0.8)
MONOCYTES NFR BLD AUTO: 6.2 % (ref 0–5)
NEUTROPHILS # BLD AUTO: 2.3 10^3/UL (ref 1.5–8.5)
NEUTROPHILS NFR BLD AUTO: 58.5 % (ref 36–66)
PLATELET # BLD AUTO: 184 10^3/UL (ref 150–450)
POTASSIUM SERPL-SCNC: 4.1 MEQ/L (ref 3.5–5.1)
PROT SERPL-MCNC: 7.3 GM/DL (ref 6.4–8.2)
RBC # BLD AUTO: 4.15 10^6/UL (ref 4–5.4)
SODIUM SERPL-SCNC: 143 MEQ/L (ref 136–145)
WBC # BLD AUTO: 3.9 10^3/UL (ref 4–10)

## 2020-07-14 PROCEDURE — 0DB78ZX EXCISION OF STOMACH, PYLORUS, VIA NATURAL OR ARTIFICIAL OPENING ENDOSCOPIC, DIAGNOSTIC: ICD-10-PCS | Performed by: INTERNAL MEDICINE

## 2020-07-14 PROCEDURE — 0DB68ZX EXCISION OF STOMACH, VIA NATURAL OR ARTIFICIAL OPENING ENDOSCOPIC, DIAGNOSTIC: ICD-10-PCS | Performed by: INTERNAL MEDICINE

## 2020-07-14 RX ADMIN — SODIUM CHLORIDE, SODIUM LACTATE, POTASSIUM CHLORIDE, CALCIUM CHLORIDE AND DEXTROSE MONOHYDRATE SCH MLS/HR: 5; 600; 310; 30; 20 INJECTION, SOLUTION INTRAVENOUS at 22:10

## 2020-07-14 RX ADMIN — Medication SCH UNITS: at 05:35

## 2020-07-14 RX ADMIN — SODIUM CHLORIDE PRN ML: 9 INJECTION, SOLUTION INTRAMUSCULAR; INTRAVENOUS; SUBCUTANEOUS at 20:07

## 2020-07-14 RX ADMIN — DEXTROSE MONOHYDRATE SCH MG: 50 INJECTION, SOLUTION INTRAVENOUS at 20:08

## 2020-07-14 RX ADMIN — SODIUM CHLORIDE SCH ML: 9 INJECTION, SOLUTION INTRAMUSCULAR; INTRAVENOUS; SUBCUTANEOUS at 05:35

## 2020-07-14 RX ADMIN — Medication SCH UNITS: at 18:18

## 2020-07-14 RX ADMIN — Medication PRN UNITS: at 08:50

## 2020-07-14 RX ADMIN — Medication PRN UNITS: at 20:07

## 2020-07-14 RX ADMIN — SODIUM CHLORIDE PRN ML: 9 INJECTION, SOLUTION INTRAMUSCULAR; INTRAVENOUS; SUBCUTANEOUS at 22:10

## 2020-07-14 RX ADMIN — SODIUM CHLORIDE SCH ML: 9 INJECTION, SOLUTION INTRAMUSCULAR; INTRAVENOUS; SUBCUTANEOUS at 18:18

## 2020-07-14 RX ADMIN — DEXTROSE MONOHYDRATE SCH MG: 50 INJECTION, SOLUTION INTRAVENOUS at 08:48

## 2020-07-14 RX ADMIN — SODIUM CHLORIDE PRN ML: 9 INJECTION, SOLUTION INTRAMUSCULAR; INTRAVENOUS; SUBCUTANEOUS at 08:50

## 2020-07-14 NOTE — CR.PDOC
General


Date of Consultation:  Jul 13, 2020


Referring Provider:  Morales Jameson


Attending Physician:  TEVIN PRAJAPATI MD





Consultation


Primary physician/ hospitalist: Dr. Jameson


Reason for consult: Gastric outlet obstruction with abnormal imaging.





HPI: 


50 year old female patient with no chronic medical comorbidities, former smoker 

( quit 1 month ago), was admitted to [facility] for persistent abdominal pain, nausea 

and vomiting and unable to keep her food down. Patient was noted to have gastric

distention with suspected outlet obstruction s/p EGD on 7/9/2020 by Dr. Jameson 

who noted pyloric ulceration with severe stenosis. Patient reports having an 

episode of upper abdominal pain few years ago when she took some medication with

improvement of the symptoms and did not seek medical evaluation. The current 

symptoms started for few weeks, where she was having fullness sensation, upper 

abdominal discomfort which progressed to severe nausea and vomiting. Patient 

also could not tolerate any food and so was avoiding food and lost atleast 10-15

pounds weight over the last few weeks. Patient also reports chronic constipation

and no prior Colonoscopy..





Pertinent negative GI symptoms:


Patient denies fever, sick contacts, recent travel, loss of appetite, early 

satiety or unintentional weight loss. No history of hematemesis, melena or 

hematochezia. 


Patient reports regular bowel movements.





Review of Systems:


GI: as stated above 


CVS: No chest pain, No palpitations, No leg swelling.


RS: No Shortness of breath, No Wheezing, no cough


CNS: No dizziness, No motor weakness, No sensory problems 


Hematology: No bruising, No gum bleeding, 


Musculoskeletal: No joint pain, ambulating well.


Skin: No rash 


: No hematuria, No burning sensation of the urine 


ENT:  No ear discharge/ pain, No dysphagia.


Eyes: No photophobia. Jaundice





Home medications: reviewed. Antithrombotic agents - None


Medical h/o: As above.


Surgical h/o: None on abdomen. 


Social h/o: Alcohol: Denies , smoking: Active smoker, trying to quit. IVDA/ 

drugs: denies. 


Family h/o of GI cancers - None


Prior Endoscopies: as per HPI. No prior colonoscopy.





Prior GI evaluations: None.





Exam: 


Vitals: reviewed 


General: Alert and oriented x 3, not in distress


HEENT: NO pallor, no icterus. Normal oropharynx,  NO cervical lymph nodes.


Chest: symmetric with bilateral clear air entry,


CVS: S1, S2 heard, normal, no murmurs .


Abdomen:  non-distended, no surgical scars, soft, non-tender, no palpable 

masses, normal bowel sounds heard.


Rectal exam: Patient refused / Deferred at this time in view of scheduled 

colonoscopy.


Extremities: no pedal edema, pulses palpable.


CNS: no focal motor or sensory deficits. Moves all extremities


Skin: no rash.





Labs: reviewed.


Imaging: reviewed. 





Impression:





- Nausea, vomiting, unable to tolerate oral diet and unintentional weight loss, 

with imaging tests showing Gastric outlet obstruction with thickening involving 

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 

superficial ulcer -- DDx-- PUD with gastric outlet near complete obstruction vs 

rule out malignancy. 





Recommendations:





- Patient educated about the test results, possible differential diagnoses and 

All questions answered.


- NPO


- Continue TPN for now.


- IV pantoprazole 40 mg twice daily for now and when tolerating oral liquids can

be switched to pantoprazole 40 mg twice daily. 


- Recommend sucralfate liquid 1gm 3 times daily when tolerating liquid diet.


- Will schedule for EGD with biopsy and possible dilation.


- The procedure, indications, risks (bleeding, perforation, infection, 

hypotension, respiratory depression, allergy, need for endotracheal intubation, 

surgery, colostomy, cardiac arrest, even death), benefits, limitations (e.g., 

missing a lesion), and all other alternatives (including no intervention) were 

explained to the patient who understood and agreed for the procedure.


- Follow operative note for post procedure recommendations.





Plan of care discussed with patient and primary team. Patient verbalized 

understanding and agreed with the plan.





Vital Signs/I&O





Vital Signs








  Date Time  Temp Pulse Resp B/P (MAP) Pulse Ox O2 Delivery O2 Flow Rate FiO2


 


7/14/20 10:00 97.6 86 18 125/83 (97) 99 Room Air  














I&O- Last 24 Hours up to 6 AM 


 


 7/14/20





 06:00


 


Intake Total 2500 ml


 


Output Total 275 ml


 


Balance 2225 ml











Laboratory Data


Labs 24H


Laboratory Tests 2


7/14/20 05:40: 


Immature Granulocyte % (Auto) 0.5, Neutrophils (%) (Auto) 58.5, Lymphocytes (%) 

(Auto) 28.6, Monocytes (%) (Auto) 6.2H, Eosinophils (%) (Auto) 5.7H, Basophils 

(%) (Auto) 0.5, Neutrophils # (Auto) 2.3, Lymphocytes # (Auto) 1.1L, Monocytes #

(Auto) 0.2, Eosinophils # (Auto) 0.2, Basophils # (Auto) 0.0, Nucleated Red 

Blood Cells % (auto) 0.0, Anion Gap 4L, Glomerular Filtration Rate > 60.0, 

Calcium Level 8.6, Total Bilirubin 0.2, Aspartate Amino Transf (AST/SGOT) 52H, 

Alanine Aminotransferase (ALT/SGPT) 70, Alkaline Phosphatase 43L, Total Protein 

7.3, Albumin 3.1L, Albumin/Globulin Ratio 0.7L


CBC/BMP


Laboratory Tests


7/14/20 05:40











Allergies


Coded Allergies:  


     No Known Allergies (Unverified , 7/7/20)





Home Medications


Scheduled PRN


Acetaminophen (Acetaminophen) 500 Mg Tablet, 1,000 MG PO Q6H PRN for PAIN, 

(Reported)











TEVIN PRAJAPATI MD      Jul 14, 2020 14:09

## 2020-07-14 NOTE — IPN
DATE:  07/13/2020

 

HISTORY:

The patient was admitted this past week with symptoms and signs of a gastric

outlet obstruction.  CT and MRI show no evidence for malignancy.  An upper

endoscopy suggested a pinhole opening into the pylorus.  She has tolerated

nothing by mouth (n.p.o.) status with no abdominal pain or nausea and vomiting.

A Gastrografin swallow was performed this morning which does show some flow of

contrast through into the duodenum.  There is no reading of this now

approximately 8-10 hours since the study was done.  There appears to be a short

segmental stricture with the second portion of the duodenum looking more normal

as outlined by contrast.

 

VITAL SIGNS:

The patient has been afebrile over the past 24 hours.  The pulse is in the 80s to

low 100s and her blood pressure is normal.

 

INTAKE AND OUTPUT:

Show that yesterday she had 1400 in with 1525 out, though I do not know that that

shows complete recording of her parenteral nutrition.

 

PHYSICAL EXAMINATION:

The patient is alert and comfortable.

She has active bowel sounds and the abdomen is soft and nontender.

 

IMPRESSION:

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

channel stricture.  She does have some flow of contrast through this on her study

today and has tolerated her own secretions for several days.

 

PLAN:

I spoke with Dr. Weems of gastroenterology about the possibility of

performing a further endoscopy with possible balloon dilation of her stricture.

He is agreeable with attempting this procedure and will see the patient later

today.  He did ask that I have the NG tube placed back in to decompress her

stomach overnight.  She will be continued on her TPN.

## 2020-07-14 NOTE — ROOR
________________________________________________________________________________

Patient Name: Sade Sebastian             Procedure Date: 7/14/2020 12:11 PM

MRN: [id number]                          Account Number: [id number]

YOB: 1970                Age: 50

                                       Gender: Female

Note Status: Finalized                 

________________________________________________________________________________

 

Procedure:           Upper GI endoscopy

Indications:         Persistent vomiting of unknown cause, Endoscopy to 

                     confirm pyloric obstruction that was demonstrated on 

                     previous imaging study, Endoscopy to confirm duodenal 

                     obstruction that was demonstrated on previous imaging 

                     study

Providers:           Johnny Weems MD

Referring MD:        Morales Jameson MD

Requesting Provider: 

Medicines:           General Anesthesia

Complications:       No immediate complications.

________________________________________________________________________________

Procedure:           Pre-Anesthesia Assessment:

                     - Prior to the procedure, a History and Physical was 

                     performed, and patient medications and allergies were 

                     reviewed. The patient is competent. The risks and 

                     benefits of the procedure and the sedation options and 

                     risks were discussed with the patient. All questions were 

                     answered and informed consent was obtained. Patient 

                     identification and proposed procedure were verified by 

                     the physician, the nurse and the anesthesiologist in the 

                     procedure room. Mental Status Examination: alert and 

                     oriented. Airway Examination: normal oropharyngeal airway 

                     and neck mobility. Respiratory Examination: clear to 

                     auscultation. CV Examination: normal. Prophylactic 

                     Antibiotics: The patient does not require prophylactic 

                     antibiotics. Prior Anticoagulants: The patient has taken 

                     no previous anticoagulant or antiplatelet agents. ASA 

                     Grade Assessment: II - A patient with mild systemic 

                     disease. After reviewing the risks and benefits, the 

                     patient was deemed in satisfactory condition to undergo 

                     the procedure. The anesthesia plan was to use general 

                     anesthesia. Immediately prior to administration of 

                     medications, the patient was re-assessed for adequacy to 

                     receive sedatives. The heart rate, respiratory rate, 

                     oxygen saturations, blood pressure, adequacy of pulmonary 

                     ventilation, and response to care were monitored 

                     throughout the procedure. The physical status of the 

                     patient was re-assessed after the procedure.

                     The Endoscope was introduced through the mouth, and 

                     advanced to the duodenal bulb. The upper GI endoscopy was 

                     accomplished without difficulty. The patient tolerated 

                     the procedure well.

                                                                                

Findings:

     The examined esophagus was normal.

     A 10 mm non-bleeding diverticulum was found at the pylorus.

     One non-obstructing non-bleeding cratered gastric ulcer of moderate 

     severity with a clean ulcer base (Adin Class III) was found at the 

     pylorus. The lesion was 10 mm in largest dimension. There is no evidence 

     of perforation. Biopsies were taken with a cold forceps for histology.

     One completely obstructing non-bleeding cratered duodenal ulcer was found 

     in the first portion of the duodenum and in the second portion of the 

     duodenum. The lesion was 15 mm in largest dimension. There is no evidence 

     of perforation.

     Biopsies were taken with a cold forceps in the gastric antrum for 

     Helicobacter pylori testing.

                                                                                

Impression:          - Normal esophagus.

                     - Gastric diverticulum.

                     - Non-obstructing non-bleeding gastric ulcer with a clean 

                     ulcer base (Adin Class III). There is no evidence of 

                     perforation. Biopsied.

                     - Completely obstructing non-bleeding duodenal ulcer. 

                     There is no evidence of perforation.

                     - Biopsies were taken with a cold forceps for 

                     Helicobacter pylori testing.

Recommendation:      - Patient has a contact number available for emergencies. 

                     The signs and symptoms of potential delayed complications 

                     were discussed with the patient. Return to normal 

                     activities tomorrow. Written discharge instructions were 

                     provided to the patient.

                     - Return patient to hospital [facility] for ongoing care.

                     - NPO.

                     - Continue parenteral nutrition for now.

                     - Use Protonix (pantoprazole) 40 mg IV BID.

                     - Observe patient's clinical course.

                     - Await pathology results.

                     - The findings and recommendations were discussed with 

                     the surgeon.

                     - Further management based on clinical course and 

                     surgical management if no response.

                     - Telephone GI clinic for pathology results in 1 week.

                     - Return to primary care physician.

                                                                                

 

Johnny Weems MD

_______________________

Johnny Weems MD

7/14/2020 5:17:29 PM

Electronically signed by Johnny Weems MD

Number of Addenda: 0

 

Note Initiated On: 7/14/2020 12:11 PM

Estimated Blood Loss:

     Estimated blood loss was minimal.

## 2020-07-14 NOTE — REP
Examination Requested: Gastrografin Upper G.I. Series With KUB

 

Reason For Exam: Evaluate for gastric outlet obstruction

 

Upper GI Air Contrast

 

The procedure was performed by BUNNY Arshad, under the direct

supervision of Dr. Bee.  The images were reviewed with Dr. Bee.

 

The  film shows no organomegaly or pathological masses.  The intestinal gas

pattern appears normal.

 

300 ml of a 50, 50 solution containing gastrograph and water was given in the

erect position as well as in the prone oblique position in order to perform this

examination.

 

The oral and pharyngeal stages of deglutition were unremarkable.  Esophageal

transport is efficient and there is no esophagitis, stricture, or mucosal ring

noted. Contrast passes freely through the GE junction.  There is no hiatal

hernia. Gastroesophageal reflux was not visualized during this exam.

 

The stomach walls are normally outlined.  The rugal folds are smooth and regular.

There is no gastritis, neoplasm, ulcer disease noted. Contrast passes III freely

through the pylorus and into the duodenum.  There is no definitive stricture.

Once reaching the duodenum.  The contrast became diluted and the duodenal sweep

is not well visualized.  There is passage of contrast into small bowel loops in

the pelvis on the 20-minute film after the examination.

 

Impression:

1.  Free flow of contrast through the GE junction, pylorus, and into the

duodenum.

2. Limited visualization of the duodenal sweep due to dilution of contrast.

3. 20-minute delayed film demonstrates contrast in the small bowel loops in the

pelvis.

 

 

0.6 minutes of fluoroscopy time was utilized for this procedure.  Some

fluoroscopic images are performed with last image hold technology.  These images

require no additional radiation.

 

 

Reviewed by

BUNNY Cohen 07/13/2020 04:17 P

Electronically Signed by

Darnell Bee MD 07/14/2020 12:24 P

## 2020-07-15 VITALS — SYSTOLIC BLOOD PRESSURE: 120 MMHG | DIASTOLIC BLOOD PRESSURE: 83 MMHG

## 2020-07-15 VITALS — SYSTOLIC BLOOD PRESSURE: 104 MMHG | DIASTOLIC BLOOD PRESSURE: 66 MMHG

## 2020-07-15 VITALS — DIASTOLIC BLOOD PRESSURE: 68 MMHG | SYSTOLIC BLOOD PRESSURE: 110 MMHG

## 2020-07-15 VITALS — DIASTOLIC BLOOD PRESSURE: 83 MMHG | SYSTOLIC BLOOD PRESSURE: 135 MMHG

## 2020-07-15 VITALS — SYSTOLIC BLOOD PRESSURE: 102 MMHG | DIASTOLIC BLOOD PRESSURE: 60 MMHG

## 2020-07-15 VITALS — DIASTOLIC BLOOD PRESSURE: 80 MMHG | SYSTOLIC BLOOD PRESSURE: 102 MMHG

## 2020-07-15 RX ADMIN — FAMOTIDINE SCH MG: 20 TABLET, FILM COATED ORAL at 20:02

## 2020-07-15 RX ADMIN — SODIUM CHLORIDE, SODIUM LACTATE, POTASSIUM CHLORIDE, CALCIUM CHLORIDE AND DEXTROSE MONOHYDRATE SCH MLS/HR: 5; 600; 310; 30; 20 INJECTION, SOLUTION INTRAVENOUS at 11:04

## 2020-07-15 RX ADMIN — SODIUM CHLORIDE PRN ML: 9 INJECTION, SOLUTION INTRAMUSCULAR; INTRAVENOUS; SUBCUTANEOUS at 09:19

## 2020-07-15 RX ADMIN — DEXTROSE MONOHYDRATE SCH MG: 50 INJECTION, SOLUTION INTRAVENOUS at 09:16

## 2020-07-15 RX ADMIN — Medication SCH UNITS: at 18:38

## 2020-07-15 RX ADMIN — PANTOPRAZOLE SODIUM SCH MG: 40 TABLET, DELAYED RELEASE ORAL at 20:02

## 2020-07-15 RX ADMIN — SODIUM CHLORIDE SCH ML: 9 INJECTION, SOLUTION INTRAMUSCULAR; INTRAVENOUS; SUBCUTANEOUS at 05:30

## 2020-07-15 RX ADMIN — SODIUM CHLORIDE SCH ML: 9 INJECTION, SOLUTION INTRAMUSCULAR; INTRAVENOUS; SUBCUTANEOUS at 18:39

## 2020-07-15 RX ADMIN — Medication SCH UNITS: at 05:31

## 2020-07-15 RX ADMIN — Medication PRN UNITS: at 09:19

## 2020-07-15 NOTE — IPN
DATE:  07/14/2020

 

HISTORY:  The patient underwent repeat endoscopy today by Dr. Weems.  He was

able to get the scope through the pylorus and see that there was a large ulcer in

the duodenal bulb.  He had also noticed a distortion in the prepyloric area with

an ulceration in that region.  Dr. Weems and I spoke following the procedure.

He has recommended that we continue her on total parenteral nutrition, though we

could allow her some clear liquids.  He suggest discharge home on total

parenteral nutrition (TPN) for several weeks and he would followup in the office

to consider repeat endoscopy or possibly imaging studies to assess the progress

of her healing.

 

Vital signs show that she has been afebrile over the past 24 hours.  Her pulse is

in the 70s and 80s and her blood pressure is normal.

 

Intake and output shows that on the 13th she had 2500 in and a small amount of

urine recorded though she has been getting up and going to the bathroom.

 

PHYSICAL EXAMINATION:  The patient is pleasant and alert.  She denies any

abdominal pain or nausea.  Abdomen is flat, soft and nontender.

 

Laboratory studies today showed a white count of 4, hemoglobin 12, hematocrit 38,

and a platelet count of 108.  Differential count was normal.  Her chemistry

profile showed normal electrolytes with the exception of a minimal elevation of

the chloride to 109.  Her BUN is 25 with a creatinine 0.56 and her glucose is

111.  Total protein is 7.3 with an albumin of 3.1.

 

IMPRESSION:  The patient tolerated her upper endoscopy well.  He was able to see

that she has a large ulcer in the duodenal bulb, which no doubt is accounting for

her obstruction.

 

PLAN:  I will start her on some clear liquids.  We will make plans for home TPN

for several weeks to allow further healing of her ulcer.  Hopefully, she will

resolve the obstruction sufficiently as the inflammation resolves to resume a

normal diet without any further intervention.

## 2020-07-16 VITALS — SYSTOLIC BLOOD PRESSURE: 108 MMHG | DIASTOLIC BLOOD PRESSURE: 72 MMHG

## 2020-07-16 VITALS — DIASTOLIC BLOOD PRESSURE: 75 MMHG | SYSTOLIC BLOOD PRESSURE: 107 MMHG

## 2020-07-16 VITALS — SYSTOLIC BLOOD PRESSURE: 121 MMHG | DIASTOLIC BLOOD PRESSURE: 63 MMHG

## 2020-07-16 VITALS — DIASTOLIC BLOOD PRESSURE: 66 MMHG | SYSTOLIC BLOOD PRESSURE: 120 MMHG

## 2020-07-16 VITALS — DIASTOLIC BLOOD PRESSURE: 71 MMHG | SYSTOLIC BLOOD PRESSURE: 117 MMHG

## 2020-07-16 VITALS — DIASTOLIC BLOOD PRESSURE: 66 MMHG | SYSTOLIC BLOOD PRESSURE: 119 MMHG

## 2020-07-16 LAB
BUN SERPL-MCNC: 13 MG/DL (ref 7–18)
CALCIUM SERPL-MCNC: 8.3 MG/DL (ref 8.5–10.1)
CHLORIDE SERPL-SCNC: 109 MEQ/L (ref 98–107)
CO2 SERPL-SCNC: 30 MEQ/L (ref 21–32)
CREAT SERPL-MCNC: 0.57 MG/DL (ref 0.55–1.3)
GFR SERPL CREATININE-BSD FRML MDRD: > 60 ML/MIN/{1.73_M2} (ref 51–?)
GLUCOSE SERPL-MCNC: 100 MG/DL (ref 70–100)
HCT VFR BLD AUTO: 34.1 % (ref 36–47)
HGB BLD-MCNC: 11.2 G/DL (ref 12–15.5)
MAGNESIUM SERPL-MCNC: 2.1 MG/DL (ref 1.8–2.4)
MCH RBC QN AUTO: 30.3 PG (ref 27–33)
MCHC RBC AUTO-ENTMCNC: 32.8 G/DL (ref 32–36.5)
MCV RBC AUTO: 92.2 FL (ref 80–96)
PHOSPHATE SERPL-MCNC: 4.5 MG/DL (ref 2.5–4.9)
PLATELET # BLD AUTO: 173 10^3/UL (ref 150–450)
POTASSIUM SERPL-SCNC: 3.8 MEQ/L (ref 3.5–5.1)
RBC # BLD AUTO: 3.7 10^6/UL (ref 4–5.4)
SODIUM SERPL-SCNC: 144 MEQ/L (ref 136–145)
TRIGL SERPL-MCNC: 108 MG/DL (ref ?–150)
WBC # BLD AUTO: 3.2 10^3/UL (ref 4–10)

## 2020-07-16 RX ADMIN — PANTOPRAZOLE SODIUM SCH MG: 40 TABLET, DELAYED RELEASE ORAL at 20:57

## 2020-07-16 RX ADMIN — PANTOPRAZOLE SODIUM SCH MG: 40 TABLET, DELAYED RELEASE ORAL at 09:11

## 2020-07-16 RX ADMIN — SODIUM CHLORIDE SCH ML: 9 INJECTION, SOLUTION INTRAMUSCULAR; INTRAVENOUS; SUBCUTANEOUS at 18:46

## 2020-07-16 RX ADMIN — Medication SCH UNITS: at 05:19

## 2020-07-16 RX ADMIN — SODIUM CHLORIDE SCH ML: 9 INJECTION, SOLUTION INTRAMUSCULAR; INTRAVENOUS; SUBCUTANEOUS at 05:20

## 2020-07-16 RX ADMIN — Medication SCH UNITS: at 18:46

## 2020-07-16 RX ADMIN — FAMOTIDINE SCH MG: 20 TABLET, FILM COATED ORAL at 20:57

## 2020-07-16 NOTE — IPN
DATE:  07/15/2020

 

HISTORY:  The patient has been doing well since her second

esophagogastroduodenoscopy (EGD) that documented her large duodenal bulb ulcer.

She continues to deny any abdominal pain.  She has been taking a few ice chips

and sips of water.  She reports that she is passing flatus and has had a couple

of small loose bowel movements and is voiding well.

 

Vital signs show that she has been afebrile.  Her pulse is in the 70s and 80s and

her blood pressure is good.

 

Intake and output show that yesterday she had 2200 in with incomplete records on

output.

 

PHYSICAL EXAMINATION:  She is alert and oriented.  Heart and lung exam is

unremarkable.  The abdomen is soft and nontender.

 

The patient has no new labs today.

 

IMPRESSION:  The patient is doing very well and has tolerated some sips of water

and ice chips.

 

PLAN:  The patient will be advanced to unlimited clear liquids.  We will continue

her total parenteral nutrition (TPN) to ensure that she has adequate caloric

intake.  Dr. Weems had recommended discharge home on TPN and we are working

toward this.  It is anticipated that she will have a training session on

Thursday, 07/16/2020, and be discharged Friday, 07/17/2020.  She will remain on

Protonix twice daily and Pepcid once daily.

## 2020-07-17 VITALS — DIASTOLIC BLOOD PRESSURE: 69 MMHG | SYSTOLIC BLOOD PRESSURE: 102 MMHG

## 2020-07-17 VITALS — DIASTOLIC BLOOD PRESSURE: 77 MMHG | SYSTOLIC BLOOD PRESSURE: 118 MMHG

## 2020-07-17 VITALS — DIASTOLIC BLOOD PRESSURE: 60 MMHG | SYSTOLIC BLOOD PRESSURE: 110 MMHG

## 2020-07-17 RX ADMIN — SODIUM CHLORIDE PRN ML: 9 INJECTION, SOLUTION INTRAMUSCULAR; INTRAVENOUS; SUBCUTANEOUS at 11:53

## 2020-07-17 RX ADMIN — SODIUM CHLORIDE SCH ML: 9 INJECTION, SOLUTION INTRAMUSCULAR; INTRAVENOUS; SUBCUTANEOUS at 06:15

## 2020-07-17 RX ADMIN — Medication SCH UNITS: at 06:15

## 2020-07-17 RX ADMIN — PANTOPRAZOLE SODIUM SCH MG: 40 TABLET, DELAYED RELEASE ORAL at 08:45

## 2020-07-17 RX ADMIN — Medication PRN UNITS: at 11:53

## 2020-07-17 NOTE — IPN
DATE:  07/16/2020

 

HISTORY:  The patient has been tolerating clear liquids over the last 24 hours.

She remains on total parenteral nutrition (TPN) to ensure her adequate caloric

needs.  She denies any pain, nausea or vomiting.

 

Vital signs show that she has been afebrile with a pulse in the 70s to low 80s

and a good blood pressure.

 

Intake and output show that yesterday she had a 2990 in with 1600 recorded out

but she has been voiding in the toilet.

 

PHYSICAL EXAMINATION:  The patient is alert and comfortable.  Heart exam shows

she is not tachycardiac.  The abdomen is thin, flat and soft.

 

Laboratory studies today show white count of 3, hemoglobin 11, hematocrit 34, and

a platelet count of 173,000.  Her chemistry profile shows sodium of 144,

potassium 3.8, chloride 109, CO2 of 30, BUN of 13, creatinine 0.6, and a glucose

of 100.  Phosphorus and magnesium are normal.

 

Pathology from her endoscopy on 07/14/2020 reveals biopsies of the pyloric ulcer

showed inflammation only without evidence of malignancy.  The biopsies of the

stomach showed some chronic gastritis without evidence of Helicobacter (H)

pylori.

 

IMPRESSION:  The patient is doing very well with her gastric outlet obstruction.

She is tolerating clear liquids without any nausea or vomiting.  She has no pain.

Her biopsies of the ulcer returned as negative for malignancy.

 

PLAN:  Plan is in place for the patient to be discharged home on 07/17/2020.  She

will be discharged home with home TPN to ensure her adequate caloric needs.  Her

gastric outlet obstruction was quite tight and Dr. Weems of gastroenterology

recommended approximately 3 weeks of TPN with clear liquids only.  He can advance

her diet as he sees fit when she follows up in the office with him.

## 2020-08-10 ENCOUNTER — HOSPITAL ENCOUNTER (OUTPATIENT)
Dept: HOSPITAL 53 - M LABSMTC | Age: 50
End: 2020-08-10
Attending: ANESTHESIOLOGY
Payer: COMMERCIAL

## 2020-08-10 DIAGNOSIS — Z20.828: ICD-10-CM

## 2020-08-10 DIAGNOSIS — Z01.818: Primary | ICD-10-CM

## 2020-08-14 ENCOUNTER — HOSPITAL ENCOUNTER (OUTPATIENT)
Dept: HOSPITAL 53 - M OPP | Age: 50
Discharge: HOME | End: 2020-08-14
Attending: INTERNAL MEDICINE
Payer: COMMERCIAL

## 2020-08-14 DIAGNOSIS — K31.89: Primary | ICD-10-CM

## 2020-08-14 DIAGNOSIS — K26.3: ICD-10-CM

## 2020-08-14 DIAGNOSIS — K31.5: ICD-10-CM

## 2020-08-28 ENCOUNTER — HOSPITAL ENCOUNTER (OUTPATIENT)
Dept: HOSPITAL 53 - M RAD | Age: 50
End: 2020-08-28
Attending: INTERNAL MEDICINE
Payer: COMMERCIAL

## 2020-08-28 DIAGNOSIS — K31.5: Primary | ICD-10-CM

## 2020-09-09 NOTE — ROOR
________________________________________________________________________________

Patient Name: Sade Sebastian             Procedure Date: 8/14/2020 1:01 PM

MRN: [id number]                          Account Number: [id number]

YOB: 1970                Age: 50

Room: [facility]                            Gender: Female

Note Status: Supervisor Override       

________________________________________________________________________________

 

Procedure:           Upper GI endoscopy

Indications:         For therapy of duodenal stenosis, Suspected acute gastric 

                     ulcer, Follow-up of acute duodenal ulcer

Providers:           Johnny Weems MD

Referring MD:        SUSANNE SANTILLAN MD, Morales Jameson MD

Requesting Provider: 

Medicines:           Monitored Anesthesia Care

Complications:       No immediate complications.

________________________________________________________________________________

Procedure:           Pre-Anesthesia Assessment:

                     - Prior to the procedure, a History and Physical was 

                     performed, and patient medications and allergies were 

                     reviewed. The patient is competent. The risks and 

                     benefits of the procedure and the sedation options and 

                     risks were discussed with the patient. All questions were 

                     answered and informed consent was obtained. Patient 

                     identification and proposed procedure were verified by 

                     the physician, the nurse and the anesthesiologist in the 

                     procedure room. Mental Status Examination: alert and 

                     oriented. Airway Examination: normal oropharyngeal airway 

                     and neck mobility. Respiratory Examination: clear to 

                     auscultation. CV Examination: normal. Prophylactic 

                     Antibiotics: The patient does not require prophylactic 

                     antibiotics. Prior Anticoagulants: The patient has taken 

                     no previous anticoagulant or antiplatelet agents. ASA 

                     Grade Assessment: II - A patient with mild systemic 

                     disease. After reviewing the risks and benefits, the 

                     patient was deemed in satisfactory condition to undergo 

                     the procedure. The anesthesia plan was to use monitored 

                     anesthesia care (MAC). Immediately prior to 

                     administration of medications, the patient was 

                     re-assessed for adequacy to receive sedatives. The heart 

                     rate, respiratory rate, oxygen saturations, blood 

                     pressure, adequacy of pulmonary ventilation, and response 

                     to care were monitored throughout the procedure. The 

                     physical status of the patient was re-assessed after the 

                     procedure.

                     The Endoscope was introduced through the mouth, and 

                     advanced to the second part of duodenum. The upper GI 

                     endoscopy was accomplished without difficulty. The 

                     patient tolerated the procedure well.

                                                                                

Findings:

     The examined esophagus was normal.

     A deformity was found at the pylorus.

     An acquired benign-appearing, intrinsic severe stenosis was found in the 

     first portion of the duodenum and was traversed after downsizing the 

     scope to ultrathin scope. Biopsies were taken with a cold forceps for 

     histology. Verification of patient identification for the specimen was 

     done by the physician and nurse using the patient's name, birth date and 

     medical record number. Estimated blood loss was minimal.

                                                                                

Impression:          - Normal esophagus.

                     - Acquired deformity in the pylorus.

                     - Acquired duodenal stenosis. Biopsied.

Recommendation:      - Patient has a contact number available for emergencies. 

                     The signs and symptoms of potential delayed complications 

                     were discussed with the patient. Return to normal 

                     activities tomorrow. Written discharge instructions were 

                     provided to the patient.

                     - Full liquid diet and soft diet.

                     - Continue with TPN for now.

                     - Continue present medications.

                     - Use Protonix (pantoprazole) 40 mg PO twice daily - to 

                     be taken in morning (1/2 hour before breakfast) and at 

                     bedtime ( atleast 3 hours after last meal) for 3 months.

                     - Use sucralfate suspension 1 gram PO QID for 3 months.

                     - Do an upper GI series in 1 week.

                     - Return to GI clinic in 2 weeks.

                     - Telephone GI clinic to schedule appointment. Please 

                     call GI clinic @ [phone] for apppointment date and 

                     time.

                     - Return to primary care physician.

                                                                                

 

_______________________

Johnny Weems MD

8/14/2020 2:03:36 PM

Number of Addenda: 0

 

Note Initiated On: 8/14/2020 1:01 PM

Estimated Blood Loss:

     Estimated blood loss was minimal.

## 2020-09-28 NOTE — REP
UPPER GI AIR CONTRAST



The procedure was performed under the direct supervision of Dr. Blackwell. The images 
were reviewed with Dr. Blackwell.



The  film shows no organomegaly or pathological masses. The intestinal gas 
pattern is nonspecific. 



Liquid barium and gas-producing granules were given in the erect position, as 
well as liquid barium in the prone oblique positions in order to perform a 
double-contrast upper GI examination. 



The oral and pharyngeal stages of deglutition are unremarkable. Esophageal 
transport is prompt and efficient and there is no esophagitis, stricture, 
mucosal ring, or hiatal hernia. Gastroesophageal reflux is not demonstrated on 
this examination. 



The fundus and body of the stomach are unremarkable. The rugal folds are smooth 
and regular. In the distal portion of the lesser curvature there is an 
outpouching, which may represent an ulcer niche. This measures 2 cm. 



In the postbulbar duodenum there is peptic scarring. The visualized portion of 
the proximal small bowel appears normal in course and caliber. 



IMPRESSION: 

* In the distal portion of the lesser curvature of the stomach, there is an 
  ulcer niche. This measures 2 cm in diameter. 

* In the postbulbar duodenum, there is peptic scarring. 



1.8 minutes of fluoroscopy time was utilized for this procedure.   

  

MTDD

## 2020-10-17 ENCOUNTER — HOSPITAL ENCOUNTER (OUTPATIENT)
Dept: HOSPITAL 53 - M LABSMTC | Age: 50
End: 2020-10-17
Attending: ANESTHESIOLOGY
Payer: COMMERCIAL

## 2020-10-17 DIAGNOSIS — Z01.812: Primary | ICD-10-CM

## 2020-10-17 DIAGNOSIS — Z20.828: ICD-10-CM

## 2020-10-22 ENCOUNTER — HOSPITAL ENCOUNTER (OUTPATIENT)
Dept: HOSPITAL 53 - M OPP | Age: 50
Discharge: HOME | End: 2020-10-22
Attending: SURGERY
Payer: COMMERCIAL

## 2020-10-22 VITALS — SYSTOLIC BLOOD PRESSURE: 114 MMHG | DIASTOLIC BLOOD PRESSURE: 66 MMHG

## 2020-10-22 VITALS — HEIGHT: 61 IN | WEIGHT: 99.8 LBS | BODY MASS INDEX: 18.84 KG/M2

## 2020-10-22 DIAGNOSIS — K26.9: Primary | ICD-10-CM

## 2020-10-22 DIAGNOSIS — K31.1: ICD-10-CM

## 2020-10-22 PROCEDURE — 43235 EGD DIAGNOSTIC BRUSH WASH: CPT

## 2020-10-22 NOTE — ROOR
________________________________________________________________________________

Patient Name: Sade Sebastian             Procedure Date: 10/22/2020 1:16 PM

MRN: [id number]                          Account Number: [id number]

YOB: 1970                Age: 50

Room: [facility]                            Gender: Female

Note Status: Finalized                 

________________________________________________________________________________

 

Procedure:           Upper GI endoscopy

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

Providers:           Morales Jameson MD

Referring MD:        SUSANNE SANTILLAN MD

Requesting Provider: 

Medicines:           Monitored Anesthesia Care

Complications:       No immediate complications.

________________________________________________________________________________

Procedure:           Pre-Anesthesia Assessment:

                     - Prior to the procedure, a History and Physical was 

                     performed, and patient medications and allergies were 

                     reviewed. The patient is competent. The risks and 

                     benefits of the procedure and the sedation options and 

                     risks were discussed with the patient. All questions were 

                     answered and informed consent was obtained. Patient 

                     identification and proposed procedure were verified by 

                     the physician, the nurse and the anesthetist in the 

                     procedure room. Mental Status Examination: alert and 

                     oriented. Airway Examination: normal oropharyngeal airway 

                     and neck mobility. Prophylactic Antibiotics: The patient 

                     does not require prophylactic antibiotics. Prior 

                     Anticoagulants: The patient has taken no previous 

                     anticoagulant or antiplatelet agents. ASA Grade 

                     Assessment: II - A patient with mild systemic disease. 

                     After reviewing the risks and benefits, the patient was 

                     deemed in satisfactory condition to undergo the 

                     procedure. The anesthesia plan was to use monitored 

                     anesthesia care (MAC). Immediately prior to 

                     administration of medications, the patient was 

                     re-assessed for adequacy to receive sedatives. The heart 

                     rate, respiratory rate, oxygen saturations, blood 

                     pressure, adequacy of pulmonary ventilation, and response 

                     to care were monitored throughout the procedure. The 

                     physical status of the patient was re-assessed after the 

                     procedure.

                     The Endoscope was introduced through the mouth, and 

                     advanced to the pylorus. The upper GI endoscopy was 

                     accomplished without difficulty. The patient tolerated 

                     the procedure well.

                                                                                

Findings:

     The examined esophagus was normal.

     A large amount of food (residue) was found in the gastric fundus and on 

     the greater curvature of the stomach.

     A benign-appearing, intrinsic severe stenosis was found at the pylorus. 

     This was non-traversed.

                                                                                

Impression:          - Normal esophagus.

                     - A large amount of food (residue) in the stomach.

                     - Gastric stenosis was found at the pylorus.

                     - No specimens collected.

Recommendation:      - Discharge patient to home.

                     - Resume previous diet.

                     - Return to my office in 1 week.

                                                                                

 

Morales Jameson MD

__________________

Morales Jameson MD

10/22/2020 1:48:49 PM

Electronically signed by Morales Jameson MD

Number of Addenda: 0

 

Note Initiated On: 10/22/2020 1:16 PM

Estimated Blood Loss:

     Estimated blood loss: none.

## 2020-12-17 ENCOUNTER — HOSPITAL ENCOUNTER (OUTPATIENT)
Dept: HOSPITAL 53 - M LABSMTC | Age: 50
End: 2020-12-17
Attending: ANESTHESIOLOGY
Payer: COMMERCIAL

## 2020-12-17 DIAGNOSIS — Z20.828: ICD-10-CM

## 2020-12-17 DIAGNOSIS — Z01.812: Primary | ICD-10-CM

## 2020-12-22 ENCOUNTER — HOSPITAL ENCOUNTER (INPATIENT)
Dept: HOSPITAL 53 - M OR | Age: 50
LOS: 3 days | Discharge: HOME | DRG: 328 | End: 2020-12-25
Attending: SURGERY | Admitting: SURGERY
Payer: COMMERCIAL

## 2020-12-22 VITALS — SYSTOLIC BLOOD PRESSURE: 119 MMHG | DIASTOLIC BLOOD PRESSURE: 70 MMHG

## 2020-12-22 VITALS — SYSTOLIC BLOOD PRESSURE: 118 MMHG | DIASTOLIC BLOOD PRESSURE: 72 MMHG

## 2020-12-22 VITALS — HEIGHT: 61 IN | BODY MASS INDEX: 18.69 KG/M2 | WEIGHT: 99 LBS

## 2020-12-22 VITALS — SYSTOLIC BLOOD PRESSURE: 136 MMHG | DIASTOLIC BLOOD PRESSURE: 83 MMHG

## 2020-12-22 VITALS — SYSTOLIC BLOOD PRESSURE: 117 MMHG | DIASTOLIC BLOOD PRESSURE: 72 MMHG

## 2020-12-22 VITALS — DIASTOLIC BLOOD PRESSURE: 73 MMHG | SYSTOLIC BLOOD PRESSURE: 119 MMHG

## 2020-12-22 VITALS — SYSTOLIC BLOOD PRESSURE: 127 MMHG | DIASTOLIC BLOOD PRESSURE: 80 MMHG

## 2020-12-22 VITALS — SYSTOLIC BLOOD PRESSURE: 142 MMHG | DIASTOLIC BLOOD PRESSURE: 84 MMHG

## 2020-12-22 DIAGNOSIS — K31.1: Primary | ICD-10-CM

## 2020-12-22 DIAGNOSIS — K27.9: ICD-10-CM

## 2020-12-22 PROCEDURE — 8E0W4CZ ROBOTIC ASSISTED PROCEDURE OF TRUNK REGION, PERCUTANEOUS ENDOSCOPIC APPROACH: ICD-10-PCS | Performed by: SURGERY

## 2020-12-22 PROCEDURE — 008Q4ZZ DIVISION OF VAGUS NERVE, PERCUTANEOUS ENDOSCOPIC APPROACH: ICD-10-PCS | Performed by: SURGERY

## 2020-12-22 PROCEDURE — 0DQ64ZZ REPAIR STOMACH, PERCUTANEOUS ENDOSCOPIC APPROACH: ICD-10-PCS | Performed by: SURGERY

## 2020-12-22 RX ADMIN — FENTANYL CITRATE PRN MCG: 50 INJECTION, SOLUTION INTRAMUSCULAR; INTRAVENOUS at 13:25

## 2020-12-22 RX ADMIN — FENTANYL CITRATE PRN MCG: 50 INJECTION, SOLUTION INTRAMUSCULAR; INTRAVENOUS at 13:31

## 2020-12-22 RX ADMIN — MORPHINE SULFATE PRN MG: 2 INJECTION, SOLUTION INTRAMUSCULAR; INTRAVENOUS at 14:46

## 2020-12-22 RX ADMIN — FENTANYL CITRATE PRN MCG: 50 INJECTION, SOLUTION INTRAMUSCULAR; INTRAVENOUS at 13:10

## 2020-12-22 RX ADMIN — FENTANYL CITRATE PRN MCG: 50 INJECTION, SOLUTION INTRAMUSCULAR; INTRAVENOUS at 13:18

## 2020-12-22 RX ADMIN — SODIUM CHLORIDE, POTASSIUM CHLORIDE, SODIUM LACTATE AND CALCIUM CHLORIDE SCH MLS/HR: 600; 310; 30; 20 INJECTION, SOLUTION INTRAVENOUS at 23:34

## 2020-12-22 RX ADMIN — SODIUM CHLORIDE, POTASSIUM CHLORIDE, SODIUM LACTATE AND CALCIUM CHLORIDE SCH MLS/HR: 600; 310; 30; 20 INJECTION, SOLUTION INTRAVENOUS at 14:48

## 2020-12-22 RX ADMIN — MORPHINE SULFATE PRN MG: 2 INJECTION, SOLUTION INTRAMUSCULAR; INTRAVENOUS at 18:53

## 2020-12-23 VITALS — DIASTOLIC BLOOD PRESSURE: 72 MMHG | SYSTOLIC BLOOD PRESSURE: 124 MMHG

## 2020-12-23 VITALS — SYSTOLIC BLOOD PRESSURE: 127 MMHG | DIASTOLIC BLOOD PRESSURE: 72 MMHG

## 2020-12-23 VITALS — SYSTOLIC BLOOD PRESSURE: 98 MMHG | DIASTOLIC BLOOD PRESSURE: 66 MMHG

## 2020-12-23 VITALS — DIASTOLIC BLOOD PRESSURE: 82 MMHG | SYSTOLIC BLOOD PRESSURE: 129 MMHG

## 2020-12-23 VITALS — DIASTOLIC BLOOD PRESSURE: 6 MMHG | SYSTOLIC BLOOD PRESSURE: 100 MMHG

## 2020-12-23 LAB
BASOPHILS # BLD AUTO: 0 10^3/UL (ref 0–0.2)
BASOPHILS NFR BLD AUTO: 0.3 % (ref 0–1)
BUN SERPL-MCNC: 20 MG/DL (ref 7–18)
CALCIUM SERPL-MCNC: 8.1 MG/DL (ref 8.5–10.1)
CHLORIDE SERPL-SCNC: 106 MEQ/L (ref 98–107)
CO2 SERPL-SCNC: 28 MEQ/L (ref 21–32)
CREAT SERPL-MCNC: 0.63 MG/DL (ref 0.55–1.3)
EOSINOPHIL # BLD AUTO: 0 10^3/UL (ref 0–0.5)
EOSINOPHIL NFR BLD AUTO: 0.4 % (ref 0–3)
GFR SERPL CREATININE-BSD FRML MDRD: > 60 ML/MIN/{1.73_M2} (ref 51–?)
GLUCOSE SERPL-MCNC: 67 MG/DL (ref 70–100)
HCT VFR BLD AUTO: 35.5 % (ref 36–47)
HGB BLD-MCNC: 11.3 G/DL (ref 12–15.5)
LYMPHOCYTES # BLD AUTO: 1.2 10^3/UL (ref 1.5–5)
LYMPHOCYTES NFR BLD AUTO: 17.5 % (ref 24–44)
MCH RBC QN AUTO: 28.8 PG (ref 27–33)
MCHC RBC AUTO-ENTMCNC: 31.8 G/DL (ref 32–36.5)
MCV RBC AUTO: 90.6 FL (ref 80–96)
MONOCYTES # BLD AUTO: 0.4 10^3/UL (ref 0–0.8)
MONOCYTES NFR BLD AUTO: 6.2 % (ref 0–5)
NEUTROPHILS # BLD AUTO: 5.3 10^3/UL (ref 1.5–8.5)
NEUTROPHILS NFR BLD AUTO: 75.3 % (ref 36–66)
PLATELET # BLD AUTO: 171 10^3/UL (ref 150–450)
POTASSIUM SERPL-SCNC: 3.6 MEQ/L (ref 3.5–5.1)
RBC # BLD AUTO: 3.92 10^6/UL (ref 4–5.4)
SODIUM SERPL-SCNC: 140 MEQ/L (ref 136–145)
WBC # BLD AUTO: 7 10^3/UL (ref 4–10)

## 2020-12-23 RX ADMIN — ENOXAPARIN SODIUM SCH MG: 40 INJECTION SUBCUTANEOUS at 09:51

## 2020-12-23 RX ADMIN — KETOROLAC TROMETHAMINE PRN MG: 30 INJECTION, SOLUTION INTRAMUSCULAR at 19:25

## 2020-12-23 RX ADMIN — DEXTROSE MONOHYDRATE SCH MG: 50 INJECTION, SOLUTION INTRAVENOUS at 09:51

## 2020-12-23 RX ADMIN — KETOROLAC TROMETHAMINE PRN MG: 30 INJECTION, SOLUTION INTRAMUSCULAR at 09:52

## 2020-12-23 RX ADMIN — SODIUM CHLORIDE, POTASSIUM CHLORIDE, SODIUM LACTATE AND CALCIUM CHLORIDE SCH MLS/HR: 600; 310; 30; 20 INJECTION, SOLUTION INTRAVENOUS at 09:52

## 2020-12-23 NOTE — REP
INDICATION:

S/P pyloroplasty day 1, water soluble contrast swallow.



COMPARISON:

None



TECHNIQUE:

This procedure was performed by [facility], under the direct

supervision of Dr. Bee.  Images were reviewed with Dr. Bee prior to dictation.



Approximately 150 mL of Isovue 300 was given orally.





FINDINGS:

The  film shows no organomegaly or pathological masses.  The intestinal gas

pattern is unremarkable.



This is a limited exam, patient is status post thyroplasty 1 day.  Esophageal

transport is prompt inefficient.  There is no evidence of stricture or

gastroesophageal reflux.





The stomach walls are normally outlined.  The rugal folds are smooth and regular.  As

are the duodenal walls.  There is no evidence of extravasation of contrast.  Contrast

flows freely from the esophagus through the stomach and through the duodenal sweep.



IMPRESSION:

Single contrast water soluble upper GI exam, without evidence of extravasation or

obstruction.



0.2 minutes of fluoroscopy time was utilized for this procedure.  Some fluoroscopic

images are performed with last image hold technology.  These images require no

additional radiation.



<Electronically signed by Jelly Mazariegos > 12/23/20 1648

<Electronically signed by Darnell Bee > 12/23/20 1701

## 2020-12-24 VITALS — SYSTOLIC BLOOD PRESSURE: 130 MMHG | DIASTOLIC BLOOD PRESSURE: 86 MMHG

## 2020-12-24 VITALS — DIASTOLIC BLOOD PRESSURE: 87 MMHG | SYSTOLIC BLOOD PRESSURE: 142 MMHG

## 2020-12-24 VITALS — SYSTOLIC BLOOD PRESSURE: 124 MMHG | DIASTOLIC BLOOD PRESSURE: 73 MMHG

## 2020-12-24 VITALS — SYSTOLIC BLOOD PRESSURE: 131 MMHG | DIASTOLIC BLOOD PRESSURE: 88 MMHG

## 2020-12-24 VITALS — SYSTOLIC BLOOD PRESSURE: 122 MMHG | DIASTOLIC BLOOD PRESSURE: 77 MMHG

## 2020-12-24 RX ADMIN — DEXTROSE MONOHYDRATE SCH MG: 50 INJECTION, SOLUTION INTRAVENOUS at 07:54

## 2020-12-24 RX ADMIN — KETOROLAC TROMETHAMINE PRN MG: 30 INJECTION, SOLUTION INTRAMUSCULAR at 07:55

## 2020-12-24 RX ADMIN — ACETAMINOPHEN PRN MG: 325 SOLUTION ORAL at 14:23

## 2020-12-24 RX ADMIN — BACITRACIN SCH DOSE: 500 OINTMENT TOPICAL at 11:51

## 2020-12-24 RX ADMIN — ENOXAPARIN SODIUM SCH MG: 40 INJECTION SUBCUTANEOUS at 07:54

## 2020-12-24 RX ADMIN — ACETAMINOPHEN PRN MG: 325 SOLUTION ORAL at 18:41

## 2020-12-24 NOTE — IPN
PROGRESS NOTE



DATE: 12/24/2020



HISTORY:  Patient is now post-op day number 2 from a robotic assisted

laparoscopic truncal vagotomy and pyloroplasty for gastric outlet obstruction

from peptic ulcer disease. She was started on clear liquids yesterday. She has

tolerated those well.



PHYSICAL EXAMINATION: Vital signs show that she has been afebrile over the past

24 hours. Pulse is in the 70s and 80s, respiratory rate is normal and her blood

pressure is excellent. 



Intake and output show that yesterday she had 2900 in, 1410 was oral, and she

had a good urine output with her drain putting out 160 mL yesterday. 



Patient is alert and oriented and appears fairly comfortable. She does complain

of some mid-abdominal discomfort. She has had no nausea or vomiting. 

Heart: Regular rate and rhythm. 

Lungs: Clear.

Abdomen: Flat and thin. She has bowel sounds present. Incisions are clean and

dry. Her drain has a small amount of lightly pink serosanguineous fluid.



LABORATORY DATA: There are no new labs today.



IMPRESSION:  Patient is doing very well 2 days post-op from her vagotomy and

pyloroplasty. She has tolerated liquids well. 



PLAN:  I will advance the patient to full liquids today. I will have her drain

removed. If she tolerates the full liquids I would expect she would go home

tomorrow. 

RICHARD

## 2020-12-24 NOTE — IPN
PROGRESS NOTE



DATE:  12/23/2020



HISTORY: Patient is postoperative day #1 from a robotic-assisted laparoscopic

truncal vagotomy and pyloroplasty. She has a single drain in place in the area

of the pyloroplasty. 



Vital signs show that the patient has been afebrile since surgery with a

maximum temperature of 99.8. Her pulse is in the 70s and 80s. Blood pressure is

good. 



Intake and output show that yesterday she had 2300 in with only 178 recorded

out. She had only 100 mL of urine output recorded, though I suspect we are

under-measuring her urine output. Her drain had 28 mL recorded out yesterday. 



PHYSICAL EXAMINATION:  Patient is lying quietly in the hospital bed. She

appears somewhat uncomfortable. Heart exam shows a regular rhythm. The lungs

are clear. The abdomen is flat. She does have some faint bowel sounds present.

Her drain has a minimal amount of serosanguineous fluid. Her other dressings

are clean and dry. The abdomen is nondistended. 



LABORATORY STUDIES:

This morning show a white count of 7, hemoglobin of 11, hematocrit 36, and a

platelet count of 171.



Her chemistry profile shows normal electrolytes with a BUN of 20, creatinine

0.6, and a glucose of 67. 



The patient had a Gastrografin swallow this morning to confirm patency of her

pyloroplasty and the absence of a leak. I reviewed these images personally,

though I have not noted a dictated report at this time. Clearly she has flow of

contrast through the area of the pyloroplasty, and there is no evidence of

leak. 



IMPRESSION: 

Patient is doing very well postoperative day #1 from her truncal vagotomy and

pyloroplasty. I will allow her to take clear liquids today and saline lock her

intravenous (IV) if she tolerates these well. I will continue her drain for

now. I will start her on some oral pain medications on an as-needed basis. I

would expect she will be ready for discharge in the next 1-2 days.

RICHARD

## 2020-12-25 VITALS — DIASTOLIC BLOOD PRESSURE: 82 MMHG | SYSTOLIC BLOOD PRESSURE: 130 MMHG

## 2020-12-25 VITALS — DIASTOLIC BLOOD PRESSURE: 83 MMHG | SYSTOLIC BLOOD PRESSURE: 130 MMHG

## 2020-12-25 RX ADMIN — DEXTROSE MONOHYDRATE SCH MG: 50 INJECTION, SOLUTION INTRAVENOUS at 08:19

## 2020-12-25 RX ADMIN — ENOXAPARIN SODIUM SCH MG: 40 INJECTION SUBCUTANEOUS at 08:19

## 2020-12-25 RX ADMIN — ENOXAPARIN SODIUM SCH MG: 40 INJECTION SUBCUTANEOUS at 08:22

## 2020-12-25 RX ADMIN — BACITRACIN SCH DOSE: 500 OINTMENT TOPICAL at 08:19

## 2020-12-27 NOTE — RO
OPERATIVE NOTE



DATE OF OPERATION:  12/22/2020



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

peptic ulcer disease.  



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

peptic ulcer disease.  



PROCEDURE PERFORMED:  Robotic assisted laparoscopic truncal vagotomy with

Stearns type pyloroplasty. 



SURGEON:  Morales Jameson MD



ANESTHESIA:  General. 



INDICATIONS FOR THE PROCEDURE:  The patient is a 50-year-old woman who has a

history of peptic ulcer disease years ago. She had presented to the hospital

this past summer with signs and symptoms of a gastric outlet obstruction.

Endoscopy revealed a markedly narrowed pylorus with a large ulcer in the

duodenum as well.  She had slight improvement with treatment of her acute

ulcers with proton pump inhibitors.  The gastroenterologist did not feel

comfortable attempting dilation of her strictured area.  Follow up endoscopy

showed persistent severe narrowing at the level of the pylorus.  She remained

quite symptomatic and she is now for a vagotomy and pyloroplasty.  



OPERATIVE PROCEDURE:  The patient was brought to the operating room and placed

on the tablet in a supine position.  She was placed under general endotracheal

anesthesia.  A Newsome catheter was placed.  An orogastric tube was inserted by

anesthesia.  The patient's abdomen was prepped and draped in a sterile fashion.

 25% Marcaine was infiltrated at each of the trocar sites as needed.  Initial

entry was in the left upper quadrant slightly above the level of the umbilicus

and just to the left of the midline.  A shortened incision was made and the

Veress needle was inserted; and after positive hanging drop test, an 8 mm port

was placed over a 5 mm camera and advanced through the abdominal wall without

difficulty.  Initial examination showed no evidence of any significant

adhesions within in the abdomen.  Three additional 8 mm ports were placed, two

on either side of the midline and slightly above the level of the initial

trocar and spaced appropriately and the 4th port was in the far lateral left

side of the abdomen.  A 5 mm port was placed in the right upper quadrant far

laterally for placement of a liver retractor.  Using a hand laparoscope for

guidance, the malleable liver retractor was inserted through the right upper

quadrant 5 mm port and formed into the retractor position. This was used to

elevate the left lobe of the liver. The upright for the Bookwalter retractor

was attached to the side of the table and the grasping arm was attached to the

liver retractor fixing this nicely in place.  The patient cart of the da Juliann

XI robot was then brought into position.  The endoscope arm was attached to the

initial trocar and the endoscope was inserted.  Targeting took place in the

area of the esophageal hiatus. The additional arms were then docked.  I would

note that prior to docking the endoscope port, the patient was tilted to

approximately a 15 degree reversed Trendelenburg position.  A grasping

retractor was inserted in the far left trocar site.  A fenestrated bipolar in

the right upper quadrant port site and the SynchroSeal device was inserted in

the medial left upper quadrant port site.  I then moved to the control console

to begin the procedure.  The thin lesser omentum was identified between the

liver and the lesser curve of the stomach and this was opened. The dissection

was carried superiorly. Initially what appeared to be the hepatic branch of the

anterior vagus nerve was preserved.  The dissection was continued above this

level and the anterior wall of the esophagus was identified.  The border of the

right juanpablo of the diaphragm was exposed.  Dissection was carried up freeing the

esophagus along the right side.  The stomach was retracted slightly to the

right and the esophagus was then dissected free on the left aspect to expose

the left juanpabol.  A portion of Penrose drain was placed posterior to the

esophagus to be used as a traction point.  Further inspection was then

performed.  It was identified that the anterior vagus appeared to have been

sacrificed during the course of exposing the anterior portion of the esophagus.

 The muscle fibers were noted and were intact, but the nerve appeared to have

been transected.  I elected to proceed with a truncal vagotomy. A portion of

this tissue that represented the anterior vagus was excised.  The posterior

vagus nerve was identified on the posterior medial aspect of the esophagus and

a segment was excised.  These two portions of tissue were sent labeled anterior

and posterior vagus nerves. There did not appear to be any significant branches

remaining, either anteriorly or posteriorly on a careful inspection.  Minimal

blood loss occurred during this portion of the procedure.  I then turned my

attention to the pyloroplasty.  There was some significant scarring in the

region of the duodenal bulb.  There were some adhesions to the overlying fundus

of the gallbladder.  These adhesions were lysed.  Initially, I proceeded with

the liver retractor in place; but after proceeding with some dissection, I

elected to remove the liver retractor, as this was more in the way than the

left lobe of the liver would be.  The stomach was quite thickened in its wall

because of her longstanding obstruction. The proximal second portion of he

duodenum and the distal duodenal bulb appeared to be thin and abnormal

consistency without scarring.  I did perform some dissection down the lateral

aspect of the loop of the duodenum to allow this to be mobilized slightly.  At

this point, I began a longitudinal incision in the area of the pylorus and

extending distally. The lumen was entered and the incision was carried into the

proximal duodenum.  The lumen had been quite narrow in this area of the pylorus

and the duodenal bulb proximally.  The incision was therefore carried slightly

more distal into the upper portion of the second portion of duodenum. The

incision was then carried backwards into the distal stomach slightly.  A

thickened area was identified at the pylorus and the incision was carried until

the lumen was clearly adequate for consideration of suturing.  The tissues did

not appear mobile enough to consider pulling them together in a transverse

closure of the longitudinal incision.  I therefore created what amounted to a

Melinda type closure of the incision.  This was begun at the inferior aspect of

the midpoint of the incision. Initially, I placed superior and inferior marker

sutures of Vicryl. I then began an absorbable 2-0 Vicryl suture at the inferior

edge at the midpoint of the incision. I then carried this inferiorly closing

the inferior edge of the pylorus and distal stomach to the medial edge of the

duodenal incision.  This was performed as a running full-thickness closure. 

Once this reached the inferior extent of the possible closure, a second suture

was begun at the superior midpoint of the longitudinal incision and this was

used to close the anterior wall and anterior and superior portion of the

pylorus and distal stomach to the lateral wall of the duodenum.  Again, this

was continued as a running full-thickness closure coming down to meet the other

suture.  The sutures were then sutured pass each other a short distance and

then cut.  I then performed a second layer of closure anteriorly again with a

running suture of an absorbable 2-0 V-Loc to remove some tension from the first

layer closure.  This appeared to give a very nice secure closure of the

pyloroplasty.  The area was irrigated and hemostasis was excellent.  I elected

to place a drain. This was best accomplished by at this point un-docking the

robot and was withdrawing the patient cart.  A 19 French Sammy drain was

inserted through one of the left upper quadrant ports and directed out through

the 5 mm assistant port in the right upper quadrant.  The drain was placed

across the subhepatic space and across the area of the pyloroplasty.  The

patient was returned to a flat position.  Final inspection showed excellent

hemostasis. The abdomen was then deflated and the trocars were all removed. 

The drain was sutured to the skin with a 2-0 silk and the site was dressed with

chlorhexidine, gluconate OpSite.  The four 8 mm ports were closed with buried

4-0 Vicryl and Steri-Strips.  Light dressings were applied.  The orogastric

tube and Newsome catheter were both removed. The patient was awakened in the

operating room, extubated and moved to the recovery room in stable condition.

## 2021-01-24 ENCOUNTER — HOSPITAL ENCOUNTER (INPATIENT)
Dept: HOSPITAL 53 - M MSPAV | Age: 51
LOS: 7 days | Discharge: HOME | DRG: 382 | End: 2021-01-31
Attending: INTERNAL MEDICINE | Admitting: INTERNAL MEDICINE
Payer: COMMERCIAL

## 2021-01-24 VITALS — BODY MASS INDEX: 19.35 KG/M2 | WEIGHT: 102.51 LBS | HEIGHT: 61 IN

## 2021-01-24 DIAGNOSIS — Z79.899: ICD-10-CM

## 2021-01-24 DIAGNOSIS — Z87.891: ICD-10-CM

## 2021-01-24 DIAGNOSIS — K20.90: ICD-10-CM

## 2021-01-24 DIAGNOSIS — K31.1: Primary | ICD-10-CM

## 2021-01-24 DIAGNOSIS — Z98.890: ICD-10-CM

## 2021-01-25 VITALS — DIASTOLIC BLOOD PRESSURE: 92 MMHG | SYSTOLIC BLOOD PRESSURE: 146 MMHG

## 2021-01-25 VITALS — DIASTOLIC BLOOD PRESSURE: 91 MMHG | SYSTOLIC BLOOD PRESSURE: 142 MMHG

## 2021-01-25 VITALS — DIASTOLIC BLOOD PRESSURE: 90 MMHG | SYSTOLIC BLOOD PRESSURE: 144 MMHG

## 2021-01-25 VITALS — DIASTOLIC BLOOD PRESSURE: 89 MMHG | SYSTOLIC BLOOD PRESSURE: 142 MMHG

## 2021-01-25 LAB
ALBUMIN SERPL BCG-MCNC: 3.3 GM/DL (ref 3.2–5.2)
ALT SERPL W P-5'-P-CCNC: 54 U/L (ref 12–78)
APTT BLD: 34.9 SECONDS (ref 24.2–38.5)
BILIRUB SERPL-MCNC: 0.7 MG/DL (ref 0.2–1)
BUN SERPL-MCNC: 20 MG/DL (ref 7–18)
CALCIUM SERPL-MCNC: 8.2 MG/DL (ref 8.5–10.1)
CHLORIDE SERPL-SCNC: 109 MEQ/L (ref 98–107)
CO2 SERPL-SCNC: 29 MEQ/L (ref 21–32)
CREAT SERPL-MCNC: 0.6 MG/DL (ref 0.55–1.3)
GFR SERPL CREATININE-BSD FRML MDRD: > 60 ML/MIN/{1.73_M2} (ref 51–?)
GLUCOSE SERPL-MCNC: 86 MG/DL (ref 70–100)
HCT VFR BLD AUTO: 34.8 % (ref 36–47)
HGB BLD-MCNC: 11.3 G/DL (ref 12–15.5)
INR PPP: 1.13
MCH RBC QN AUTO: 30.2 PG (ref 27–33)
MCHC RBC AUTO-ENTMCNC: 32.5 G/DL (ref 32–36.5)
MCV RBC AUTO: 93 FL (ref 80–96)
PLATELET # BLD AUTO: 210 10^3/UL (ref 150–450)
POTASSIUM SERPL-SCNC: 3.5 MEQ/L (ref 3.5–5.1)
PROT SERPL-MCNC: 6.2 GM/DL (ref 6.4–8.2)
PROTHROMBIN TIME: 14.8 SECONDS (ref 12.5–14.3)
RBC # BLD AUTO: 3.74 10^6/UL (ref 4–5.4)
RSV RNA NPH QL NAA+PROBE: NEGATIVE
SODIUM SERPL-SCNC: 143 MEQ/L (ref 136–145)
WBC # BLD AUTO: 5.9 10^3/UL (ref 4–10)

## 2021-01-25 RX ADMIN — POTASSIUM CHLORIDE, DEXTROSE MONOHYDRATE AND SODIUM CHLORIDE SCH MLS/HR: 150; 5; 450 INJECTION, SOLUTION INTRAVENOUS at 10:52

## 2021-01-25 RX ADMIN — DEXTROSE MONOHYDRATE SCH MG: 50 INJECTION, SOLUTION INTRAVENOUS at 20:13

## 2021-01-25 RX ADMIN — POTASSIUM CHLORIDE, DEXTROSE MONOHYDRATE AND SODIUM CHLORIDE SCH MLS/HR: 150; 5; 450 INJECTION, SOLUTION INTRAVENOUS at 20:13

## 2021-01-25 NOTE — IPN
PROGRESS NOTE



DATE:  01/25/2021





SUBJECTIVE: Sade is a patient who found her way onto the hospital service.

She underwent a laparoscopic truncal vagotomy with pyloroplasty in December for

a pyloric stenosis from peptic ulcer disease. Recovered well. She was admitted

with abdominal pain and distention. Had CT of the abdomen and pelvis at

[facility] that showed marked fluid distention, findings consistent

with proximal duodenal C-loop obstruction probably due to the volvulus and

underlying mass from a lead point could not be excluded. A nasogastric tube was

passed. Surgery was consulted, but admitting doctor was advised to have the day

team contact Dr. Jameson today who performed the surgery. He and I did speak

today. He is aware of the admission.



OBJECTIVE:

VITAL SIGNS: Afebrile. Vital signs are stable.

GENERAL APPEARANCE: She is alert, conversant, and feels better with nasogastric

functioning in place. 

LUNGS: Clear.

HEART: Regular rate and rhythm. 

ABDOMEN: Soft. Decompressed. Nontender. 

EXTREMITIES: No peripheral edema. 



IMPRESSION: Duodenal obstruction. 



PLAN: Nasogastric tube is in place. She is receiving intravenous (IV) fluids.

Dr. Jameson is aware of the admission. Further treatment as per surgery.

## 2021-01-25 NOTE — HPEPDOC
[facility] Medical History & Physical


Date of Admission


Jan 25, 2021


Date of Service:  Jan 25, 2021


Attending Physician:  DENITA WEISS MD





History and Physical


TIME OF SERVICE: 405AM


   


CHIEF COMPLAINT: vomiting





HISTORY OF PRESENT ILLNESS: 


This 50 yr old F underwent lap truncal vagotomy with pyloroplasty in December to

manage pyloric stenosis 2/2 PUD with gastric outlet obstruction; her second 

follow up appointment with  is scheduled later on today.





On Friday evening after eating dinner she had an abnormal sensation in her 

abdomen and felt like she would not be able to digest her food. On Saturday she 

developed left mid & lower abdominal discomfort and nausea; she didnt eat the 

whole day because she felt to bloated, but she did have a BM. On Sunday her 

abdomen begun to feel more tight and she vomited brow fluid which she 

describes as being smooth and thick in texture. She didnt notice if it was 

foul in smelling. She denies having fevers but had an episode of chills and 

sweating which she attributes to not eating for the last few days.





Work-up at [facility]:


WBC 7.0, Hg 14.5, Plt 274


Na 138, K 3.7,Cl 94, CO2 30, BUN 24, Cr 0.7, Glucose 130


Lipase 48, lactic acid 2.8


CT abd/pelvis  Marked fluid distention. Findings consistent with proximal 

duodenal c-loop obstruction possibly due to volvulus and [an] underlying mass 

for a lead point is not excluded. Mild hiatal hernia.


Providers at [place] requested transfer for a higher level of care.





REVIEW OF SYSTEMS: 12-point review of systems negative except as listed in HPI





PAST MEDICAL/ SURGICAL HISTORY:


PUD


Tubal ligation


Laproscopic truncal vagotomy with pyloroplasty





SOCIAL HISTORY:


Quit smoking in March of 2020. 


Doesnt drink or use recreational drugs


 dependent





FAMILY HISTORY:


CHF, CKD, HTN, DM


   


ALLERGIES: Please see below.





HOME MEDICATIONS: Please see below. 





PHYSICAL EXAMINATION:


VITAL SIGNS:  Please see below.


GENERAL APPEARANCE: well nourished / well developed/ NAD 


HEENT: NG in place


CARDIOVASCULAR: RRR/NMRG/ radial pulses intact/ no LE edema


LUNGS: CTAB 


ABDOMEN: post laparoscopy incision scars have healed well / abdomen is slightly 

distended and tender with palpation especially on the left


MUSCULOSKELETAL: YAN x 4


INTEGUMENT: flat hyperpigmented scars on abdomen


NEUROLOGICAL: CN 2-12 intact / speech not dysarthric


PSYCHIATRIC: A&Ox 3 / able to understand and follow all commands





LABORATORY DATA: see HPI





IMAGING: see HPI





ASSESSMENT: 


 is a 50 yr old who underwent laparoscopic truncal vagotomy with 

pyloroplasty to manage PUD w gastric outlet obstruction; she presented to [facility] w 

c/o abdominal pressure, vomiting and obstipation and was transferred to  for a

 higher level of care.





PLAN:


1. N/V/abdominal pain


Possibly due to reoccurrence of gastric outlet obstruction


Plan: admit to medical floor /f/u CBC, BMP, lactic acid, Mg / NG to LIS / NPO w 

D5NS  & f/u FSBS to monitor for hypoglycemia / dilaudid PRN for pain / Zofran 

PRN for n/f/ per d/w  will ask the day time team to contact  

to follow up 





DVT Px SCDs





Dispo: home after more than 2 midnights stay





Home Medications


Scheduled


Ascorbic Acid (Vitamin C) 500 Mg Tablet, 500 MG PO DAILY


Cholecalciferol (Vitamin D3) (Vitamin D3) 1,000 Unit Tablet, Unknown Dose PO ANGEL

LY


Pantoprazole Sodium (Pantoprazole Sodium) 40 Mg Tablet., 40 MG PO BID





Allergies


Coded Allergies:  


     No Known Allergies (Unverified , 7/7/20)





A-FIB/CHADSVASC


A-FIB History


Current/History of A-Fib/PAF?:  No


Current PO Anticoag Therapy:  No











DENITA WEISS MD                Jan 25, 2021 03:55

## 2021-01-25 NOTE — CR
CONSULTATION

DATE: 01/25/2021



REASON FOR CONSULTATION:  Recurrent gastric outlet obstruction.



HISTORY OF PRESENT ILLNESS: The patient is a very pleasant, 50-year-old woman,

well known to me from recent surgery. I had met this patient in July when she

presented with a history of nausea and vomiting and was found to have gastric

outlet obstruction related to scarring in the pylorus and an acute duodenal

ulcer. She was seen and endoscoped both by me and Dr. Weems of

gastroenterology. She was treated with proton pump inhibitors and her acute

ulcer healed but she was left with a significant narrowing in the pylorus. Dr. Weems did not think that balloon dilatation was likely to result in a

lasting improvement and was also concerned about the possibility of perforation

given the location of the narrowing. She did well clinically with

resolution of her acute ulcer but then developed recurrence of her symptoms

with intolerance for solid foods. A repeat upper endoscopy was performed on

October 22nd and this revealed a large amount of food residue within the

stomach and a benign appearing intrinsic severe stenosis at the pylorus. It was

not possible to advance the scope through the narrowing. The patient underwent a

truncal vagotomy with pyloroplasty as a robotic-assisted laparoscopic procedure

on the 22nd of December. She had a contrast swallow done on postop day #1 that

showed free flow of contrast through her pyloroplasty with no evidence of

significant narrowing or leak. When seen in the office on the 30th of December,

she reported that she was doing very well, tolerating a bland soft diet. She

was to follow up with me in the office today, the 25th of January. However, the

patient was admitted to [facility] early in the morning on the 25th by the

hospitalist service. The patient reported that on Friday evening, the 22nd of January, she had noticed some fullness in the upper abdomen. She ate very

little during the course of the day on Saturday and noted some discomfort in

the left mid and lower abdomen with some nausea. On Sunday the 14th, she began

to feel more distended and she developed some vomiting with some brownish

fluid. She presented at [facility] where she was evaluated with a

CT scan and some laboratory studies. The CT scan reportedly showed a distended

fluid-filled stomach with evidence for obstruction in the region of the

proximal duodenum. She was transferred from [facility] to [facility]

for a higher level of care. I was consulted this morning to evaluate her. She

had a nasogastric tube placed at the time of admission. 



ALLERGIES: The patient has no known allergies to medications.



MEDICATIONS: The patient has remained on pantoprazole 40 mg p.o. twice daily. 



PAST MEDICAL HISTORY:  Significant only for her history of peptic ulcer

disease. She does report having had an episode of transient outlet obstruction

some years ago apparently related to peptic ulcer disease which remitted with

medical therapy. Since July, she has been dealing with her current bout of

gastric outlet obstruction with evidence for initially acute duodenal ulcer.



PAST SURGICAL HISTORY:  The patient had a bilateral tubal ligation in 1997. She

has had at least three upper endoscopies in the last six months to a year. She

underwent a robotic-assisted laparoscopic truncal vagotomy and pyloroplasty on

December 22, 2020. 



SOCIAL HISTORY: The patient denies any significant alcohol intake. She is a

former fairly light smoker but reports that she has now quit.



FAMILY HISTORY: Noncontributory.



REVIEW OF SYSTEMS:  Reveals no history of chest pain, palpitations or cardiac

problems. She has no cough, wheezing or sputum production. She denies any

melena or hematochezia or history of gastrointestinal bleeding. She has no

dysuria or hematuria. She denies any bone or joint problems. She has had no

history of DVT or pulmonary embolus. There is no history of chronic severe

headaches, seizure or stroke.



PHYSICAL EXAMINATION:

General: Reveals a very pleasant woman sitting up in the hospital bed with a

nasogastric tube in place. She is alert, oriented and cooperative. She

recognizes me readily.

Skin: Warm and dry.

HEENT: Sclerae are anicteric. 

Neck: Supple without mass.

Heart: Shows a regular rate and rhythm without murmur.

Lungs: Clear to auscultation bilaterally.

Abdomen: Shows five small scars across the upper abdomen consistent with her

recent laparoscopic surgery. They appear to be well healed. The abdomen remains

somewhat full. She does have bowel sounds present. There is some dullness to

percussion in the left upper quadrant and left mid-abdomen. On palpation, there

is a sensation of fullness in the left upper quadrant and left mid-abdomen but

there is no significant tenderness to palpation. There is no sign of abdominal

hernia. 

Extremities: Show palpable radial and pedal pulses with no peripheral edema. 



LABORATORY STUDIES: From this morning at [facility] show a white count of 6,

hemoglobin 11, hematocrit 35 and a platelet count 210,000. Her PT is 14.8 with

an INR of 1.1 ands a PTT of 35. Chemistry profile shows sodium 143, potassium

3.5, chloride 109, CO2 of 29, BUN of 20, creatinine 0.6 and a glucose of 86.

Liver function tests are normal with a total protein of 6.2 and albumin of 3.3.

COVID and other respiratory illness testing is negative.



IMAGING STUDIES: She had a disk from a CT scan obtained at [facility] in the chart and I reviewed the images from this study. She has a

markedly distended and fluid-filled stomach which extends all the way down into

the pelvis and loops back up to the duodenum. There is no free fluid or free

air. The lumen of the second portion of the duodenum is evident and contains a

small amount of air. It is difficult to  the area of her pyloroplasty

although there is certainly no evidence of infection or fluid collection or

leaks. 



IMPRESSION:  The patient is now one month postop from a laparoscopic truncal

vagotomy and pyloroplasty. She had initially done very well from her surgery

but now seems to have redeveloped a gastric outlet obstruction.



RECOMMENDATIONS:  I would certainly continue her nasogastric tube and keep her

NPO at this time. Once her stomach has had an opportunity to decompress, a

repeat endoscopy would be appropriate to assess her pyloroplasty. It may be

that a balloon dilatation would be possible to improve the outflow but I

suspect that we will ultimately need to proceed with another drainage

procedure, probably a gastrojejunostomy to address her issues. I counseled her

that we will need to keep her NPO and try to decompress her stomach before

proceeding with the endoscopy. She understands the situation and is agreeable

with the plan. I will put her on her Protonix twice daily although as she has

had a truncal vagotomy, her gastric acid production should be quite low. 

[facility]

## 2021-01-26 VITALS — DIASTOLIC BLOOD PRESSURE: 84 MMHG | SYSTOLIC BLOOD PRESSURE: 148 MMHG

## 2021-01-26 VITALS — DIASTOLIC BLOOD PRESSURE: 84 MMHG | SYSTOLIC BLOOD PRESSURE: 132 MMHG

## 2021-01-26 VITALS — SYSTOLIC BLOOD PRESSURE: 127 MMHG | DIASTOLIC BLOOD PRESSURE: 83 MMHG

## 2021-01-26 LAB
BUN SERPL-MCNC: 7 MG/DL (ref 7–18)
CALCIUM SERPL-MCNC: 8.5 MG/DL (ref 8.5–10.1)
CHLORIDE SERPL-SCNC: 112 MEQ/L (ref 98–107)
CO2 SERPL-SCNC: 28 MEQ/L (ref 21–32)
CREAT SERPL-MCNC: 0.56 MG/DL (ref 0.55–1.3)
GFR SERPL CREATININE-BSD FRML MDRD: > 60 ML/MIN/{1.73_M2} (ref 51–?)
GLUCOSE SERPL-MCNC: 95 MG/DL (ref 70–100)
HCT VFR BLD AUTO: 35.8 % (ref 36–47)
HGB BLD-MCNC: 11.5 G/DL (ref 12–15.5)
MAGNESIUM SERPL-MCNC: 1.9 MG/DL (ref 1.8–2.4)
MCH RBC QN AUTO: 29.9 PG (ref 27–33)
MCHC RBC AUTO-ENTMCNC: 32.1 G/DL (ref 32–36.5)
MCV RBC AUTO: 93 FL (ref 80–96)
PLATELET # BLD AUTO: 210 10^3/UL (ref 150–450)
POTASSIUM SERPL-SCNC: 3.7 MEQ/L (ref 3.5–5.1)
RBC # BLD AUTO: 3.85 10^6/UL (ref 4–5.4)
SODIUM SERPL-SCNC: 144 MEQ/L (ref 136–145)
WBC # BLD AUTO: 5 10^3/UL (ref 4–10)

## 2021-01-26 RX ADMIN — DEXTROSE MONOHYDRATE SCH MG: 50 INJECTION, SOLUTION INTRAVENOUS at 08:41

## 2021-01-26 RX ADMIN — SODIUM CHLORIDE SCH UNITS: 4.5 INJECTION, SOLUTION INTRAVENOUS at 20:25

## 2021-01-26 RX ADMIN — POTASSIUM CHLORIDE, DEXTROSE MONOHYDRATE AND SODIUM CHLORIDE SCH MLS/HR: 150; 5; 450 INJECTION, SOLUTION INTRAVENOUS at 06:12

## 2021-01-26 RX ADMIN — DEXTROSE AND SODIUM CHLORIDE SCH MLS/HR: 5; 450 INJECTION, SOLUTION INTRAVENOUS at 08:41

## 2021-01-26 RX ADMIN — DEXTROSE AND SODIUM CHLORIDE SCH MLS/HR: 5; 450 INJECTION, SOLUTION INTRAVENOUS at 17:48

## 2021-01-26 RX ADMIN — DEXTROSE MONOHYDRATE SCH MG: 50 INJECTION, SOLUTION INTRAVENOUS at 20:25

## 2021-01-26 NOTE — IPNPDOC
Date Seen


The patient was seen on 1/26/21.





Progress Note


SUBJECTIVE: 


No acute complaints overnight beside discomfort from NG tube to low intermittent

suction. Denies chest pain, shortness of breath, nausea, abdominal pain. 





OBJECTIVE:





PHYSICAL EXAMINATION: 


VITAL SIGNS: 


Please see below 


GENERAL APPEARANCE: NAD, resting in bed, AAOx3


HEENT: NG tube in place, secured. 


NECK: symmetrical 


LUNGS: CTAB, No W/R/R


HEART: S1S2 +, no M/R/G 


ABDOMEN: Soft. Decompressed. Nontender. BS + 4 quadrants


EXTREMITIES: No peripheral edema. 


NEURO: CN 2-12 intact


Psych: Mood and affected appropriate 





LABS: 


Please see below 





IMAGING: 


No new imaging 





A/P: 


 


Gastric outlet obstruction


-NG tube in place with low intermittent suction 


-Per surgery, likely needing endoscopy to assess pyloroplasty. May also need 

balloon dilatation. 


-PPI BID


-NPO, IVFs


-Pain control PRN 


-Surgery consulted, f/u recommendations 





GI px


-PPI 





DVT px 


-Heparin SC 





DISPOSITION: Possible scope 1/27/21, f/u Surgery (Dr. Jameson's) 

recommendations. Plan is home when medically improved.





VS, I&O, 24H, Fishbone


Vital Signs/I&O





Vital Signs








  Date Time  Temp Pulse Resp B/P (MAP) Pulse Ox O2 Delivery O2 Flow Rate FiO2


 


1/26/21 14:00 98.2 91 20 127/83 (98) 98 Room Air  














I&O- Last 24 Hours up to 6 AM 


 


 1/26/21





 06:00


 


Intake Total 2260 ml


 


Output Total 2250 ml


 


Balance 10 ml











Laboratory Data


24H LABS


Laboratory Tests 2


1/26/21 06:23: 


Nucleated Red Blood Cells % (auto) 0.0, Anion Gap 4L, Glomerular Filtration Rate

> 60.0, Calcium Level 8.5, Magnesium Level 1.9


CBC/BMP


Laboratory Tests


1/26/21 06:23











Current Medications





Current Medications








 Medications


  (Trade)  Dose


 Ordered  Sig/Gordy


 Route


 PRN Reason  Start Time


 Stop Time Status Last Admin


Dose Admin


 


 Dextrose/Sodium


 Chloride  1,000 ml @ 


 90 mls/hr  Q11H7M


 IV


   1/25/21 06:45


 1/25/21 09:04 DC 1/25/21 07:29





 


 Dextrose/Sodium


 Chloride  1,000 ml @ 


 100 mls/hr  Q10H


 IV


   1/26/21 08:30


    1/26/21 08:41





 


 Home Med


  (Med Rec


 Complete!)    ASDIRECTED


 XX


   1/25/21 08:00


 1/25/21 07:48 DC  





 


 Hydromorphone HCl


  (Dilaudid)  0.5 mg  Q3HP  PRN


 IV


 MILD PAIN (PS 1-4)  1/25/21 06:30


     





 


 Hydromorphone HCl


  (Dilaudid)  0.8 mg  Q3HP  PRN


 IV


 MODERATE/SEVERE PAIN (PS 5-10)  1/25/21 06:30


     





 


 Ondansetron HCl


  (ZOFRAN


 INJection)  2 mg  Q4HP  PRN


 IV


 NAUSEA OR VOMITING  1/25/21 06:45


     





 


 Pantoprazole


 Sodium


  (Protonix)  40 mg  BID


 IV


   1/25/21 21:00


    1/26/21 08:41





 


 Potassium


 Chloride/Dextrose/


 Sod Cl  1,000 ml @ 


 100 mls/hr  Q10H


 IV


   1/25/21 09:15


 1/26/21 08:28 DC 1/26/21 06:12





 


 Sodium Chloride  1,000 ml @ 


 90 mls/hr  Q11H7M


 IV


   1/25/21 01:01


 1/25/21 06:41 DC 1/25/21 04:07














Allergies


Coded Allergies:  


     No Known Allergies (Unverified , 7/7/20)











Lourdes Patiño MD            Jan 26, 2021 17:01

## 2021-01-26 NOTE — IPN
PROGRESS NOTE



DATE:  01/26/2021





HISTORY:  The patient was seen yesterday after she was readmitted with evidence

for a recurrence of her gastric outlet obstruction. The patient had undergone a

robotic assisted laparoscopic pyloroplasty with truncal vagotomy just about a

month ago.  She has a nasogastric tube in place for decompression and is n.p.o.

 She denies any pain today.  



Vital signs show that she has been afebrile over the past 24 hours.  Her pulse

has been in the 80s to low 90s and her blood pressure is excellent.  Room air

oxygen saturation is normal. 



Intake and output showed that yesterday she had 1840 in with 1925 out.  She had

1075 of NG drainage and that seems to be diminished today.  



PHYSICAL EXAMINATION:  The patient is alert and oriented.  She denies any pain.

Abdomen is flat and soft.  She does have some bowel sounds present.  The NG

tube is in to an appropriate depth and is draining a small amount of lightly

turbid fluid. 



LABORATORY STUDIES:  Today show a white count of 5, hemoglobin 12, hematocrit

36 and a platelet count of 210,000.  The electrolytes show her to have a sodium

of 144, potassium 3.7, chloride 112, Co2 28, BUN of 7, creatinine 0.56 and a

glucose of 95.  



IMPRESSION :  The patient has a recurrence of her gastric outlet obstruction

now just one month postop from her truncal vagotomy and pyloroplasty. 



PLAN:  The patient was counseled for an upper endoscopy.  This will be

scheduled for the afternoon of the 27th of January. Her NG tube should be

remain in place for the time being.  If an area is found that could be dilated

with a balloon dilator, then this can be accomplished tomorrow.  If she has

significant scaring that would preclude dilation, then the patient will require

a gastrojejunostomy.

## 2021-01-27 VITALS — DIASTOLIC BLOOD PRESSURE: 87 MMHG | SYSTOLIC BLOOD PRESSURE: 146 MMHG

## 2021-01-27 VITALS — DIASTOLIC BLOOD PRESSURE: 84 MMHG | SYSTOLIC BLOOD PRESSURE: 146 MMHG

## 2021-01-27 VITALS — DIASTOLIC BLOOD PRESSURE: 83 MMHG | SYSTOLIC BLOOD PRESSURE: 145 MMHG

## 2021-01-27 LAB
ALBUMIN SERPL BCG-MCNC: 3.7 GM/DL (ref 3.2–5.2)
ALT SERPL W P-5'-P-CCNC: 45 U/L (ref 12–78)
BILIRUB SERPL-MCNC: 0.5 MG/DL (ref 0.2–1)
BUN SERPL-MCNC: 4 MG/DL (ref 7–18)
CALCIUM SERPL-MCNC: 9.3 MG/DL (ref 8.5–10.1)
CHLORIDE SERPL-SCNC: 105 MEQ/L (ref 98–107)
CO2 SERPL-SCNC: 29 MEQ/L (ref 21–32)
CREAT SERPL-MCNC: 0.59 MG/DL (ref 0.55–1.3)
GFR SERPL CREATININE-BSD FRML MDRD: > 60 ML/MIN/{1.73_M2} (ref 51–?)
GLUCOSE SERPL-MCNC: 100 MG/DL (ref 70–100)
HCT VFR BLD AUTO: 37.9 % (ref 36–47)
HGB BLD-MCNC: 12.2 G/DL (ref 12–15.5)
MCH RBC QN AUTO: 29 PG (ref 27–33)
MCHC RBC AUTO-ENTMCNC: 32.2 G/DL (ref 32–36.5)
MCV RBC AUTO: 90.2 FL (ref 80–96)
PLATELET # BLD AUTO: 240 10^3/UL (ref 150–450)
POTASSIUM SERPL-SCNC: 3.4 MEQ/L (ref 3.5–5.1)
PROT SERPL-MCNC: 7.2 GM/DL (ref 6.4–8.2)
RBC # BLD AUTO: 4.2 10^6/UL (ref 4–5.4)
SODIUM SERPL-SCNC: 143 MEQ/L (ref 136–145)
WBC # BLD AUTO: 5.5 10^3/UL (ref 4–10)

## 2021-01-27 PROCEDURE — 0DJ08ZZ INSPECTION OF UPPER INTESTINAL TRACT, VIA NATURAL OR ARTIFICIAL OPENING ENDOSCOPIC: ICD-10-PCS | Performed by: SURGERY

## 2021-01-27 RX ADMIN — DEXTROSE AND SODIUM CHLORIDE SCH MLS/HR: 5; 450 INJECTION, SOLUTION INTRAVENOUS at 02:56

## 2021-01-27 RX ADMIN — POTASSIUM CHLORIDE SCH MLS/HR: 7.46 INJECTION, SOLUTION INTRAVENOUS at 09:55

## 2021-01-27 RX ADMIN — DEXTROSE MONOHYDRATE SCH MG: 50 INJECTION, SOLUTION INTRAVENOUS at 20:30

## 2021-01-27 RX ADMIN — DEXTROSE MONOHYDRATE SCH MG: 50 INJECTION, SOLUTION INTRAVENOUS at 09:54

## 2021-01-27 RX ADMIN — DEXTROSE AND SODIUM CHLORIDE SCH MLS/HR: 5; 450 INJECTION, SOLUTION INTRAVENOUS at 18:29

## 2021-01-27 RX ADMIN — POTASSIUM CHLORIDE SCH MLS/HR: 7.46 INJECTION, SOLUTION INTRAVENOUS at 11:53

## 2021-01-27 RX ADMIN — SODIUM CHLORIDE SCH UNITS: 4.5 INJECTION, SOLUTION INTRAVENOUS at 20:30

## 2021-01-27 RX ADMIN — SODIUM CHLORIDE SCH UNITS: 4.5 INJECTION, SOLUTION INTRAVENOUS at 09:00

## 2021-01-27 NOTE — ROOR
________________________________________________________________________________

Patient Name: Sade Sebastian             Procedure Date: 1/27/2021 3:13 PM

MRN: [id number]                          Account Number: [id number]

YOB: 1970                Age: 50

Room: [facility]                            Gender: Female

Note Status: Finalized                 

________________________________________________________________________________

 

Procedure:            Upper GI endoscopy

Indications:          Diagnostic procedure, Follow-up of pyloric stenosis, 

                      Patient with recurrrent gastric outlet obstruction 1 

                      month post pyloroplasty.

Providers:            Morales Jameson MD

Referring MD:         SUSANNE SANTILLAN MD

Requesting Provider:  

Medicines:            Monitored Anesthesia Care

Complications:        No immediate complications.

________________________________________________________________________________

Procedure:            Pre-Anesthesia Assessment:

                      - Prior to the procedure, a History and Physical was 

                      performed, and patient medications and allergies were 

                      reviewed. The patient is competent. The risks and 

                      benefits of the procedure and the sedation options and 

                      risks were discussed with the patient. All questions 

                      were answered and informed consent was obtained. Patient 

                      identification and proposed procedure were verified by 

                      the physician, the nurse and the anesthetist in the 

                      procedure room. Mental Status Examination: alert and 

                      oriented. Airway Examination: normal oropharyngeal 

                      airway and neck mobility. Respiratory Examination: clear 

                      to auscultation. Prophylactic Antibiotics: The patient 

                      does not require prophylactic antibiotics. Prior 

                      Anticoagulants: The patient has taken no previous 

                      anticoagulant or antiplatelet agents. ASA Grade 

                      Assessment: II - A patient with mild systemic disease. 

                      After reviewing the risks and benefits, the patient was 

                      deemed in satisfactory condition to undergo the 

                      procedure. The anesthesia plan was to use monitored 

                      anesthesia care (MAC). Immediately prior to 

                      administration of medications, the patient was 

                      re-assessed for adequacy to receive sedatives. The heart 

                      rate, respiratory rate, oxygen saturations, blood 

                      pressure, adequacy of pulmonary ventilation, and 

                      response to care were monitored throughout the 

                      procedure. The physical status of the patient was 

                      re-assessed after the procedure.

                      The Endoscope was introduced through the mouth, and 

                      advanced to the second part of duodenum. The upper GI 

                      endoscopy was accomplished without difficulty. The 

                      patient tolerated the procedure well.

                                                                                

Findings:

     Non-severe esophagitis with no bleeding was found at the gastroesophageal 

     junction.

     The entire examined stomach was normal.

     There was evidence of a patent but strictured pyloroplasty in the 

     duodenal bulb. This was characterized by erythema, friable mucosa, mild 

     stenosis and visible sutures.

     The second portion of the duodenum and major papilla were normal.

                                                                                

Impression:           - Non-severe acute esophagitis.

                      - Normal stomach.

                      - Patent but strictured pyloroplasty, characterized by 

                      erythema, friable mucosa, mild stenosis and visible 

                      sutures was found.

                      - Normal second portion of the duodenum and major 

                      papilla.

                      - No specimens collected.

Recommendation:       - Return patient to hospital [facility] for ongoing care.

                      - NPO.

                      - Observe patient's clinical course.

                                                                                

Procedure Code(s):    --- Professional ---

                      93242, Esophagogastroduodenoscopy, flexible, transoral; 

                      diagnostic, including collection of specimen(s) by 

                      brushing or washing, when performed (separate procedure)

Diagnosis Code(s):    --- Professional ---

                      K20.9, Esophagitis, unspecified

                      Z98.890, Other specified postprocedural states

                      K31.1, Adult hypertrophic pyloric stenosis

 

CPT copyright 2019 [employer]. All rights reserved.

 

The codes documented in this report are preliminary and upon  review may 

be revised to meet current compliance requirements.

 

Morales Jameson MD

__________________

Morales Jameson MD

1/27/2021 3:51:02 PM

Electronically signed by Morales Jameson MD

Number of Addenda: 0

 

Note Initiated On: 1/27/2021 3:13 PM

Estimated Blood Loss: Estimated blood loss was minimal.

## 2021-01-27 NOTE — IPNPDOC
Date Seen


The patient was seen on 1/27/21.





Progress Note


SUBJECTIVE: 


Endoscopy scheduled for today. Denies chest pain, shortness of breath, nausea, 

abdominal pain. 





OBJECTIVE:





PHYSICAL EXAMINATION: 


VITAL SIGNS: 


Please see below 


GENERAL APPEARANCE: NAD, resting in bed, AAOx3


HEENT: NG tube in place, secured. 


NECK: symmetrical 


LUNGS: CTAB, No W/R/R


HEART: S1S2 +, no M/R/G 


ABDOMEN: Soft. Decompressed. Nontender. BS + 4 quadrants


EXTREMITIES: No peripheral edema. 


NEURO: CN 2-12 intact


Psych: Mood and affected appropriate 





LABS: 


Please see below 





IMAGING: 


No new imaging 





A/P: 


 


Gastric outlet obstruction


-NG tube in place with low intermittent suction 


-Endoscopy today to further assess pyloroplasty. May also need balloon 

dilatation. 


-PPI BID


-NPO, IVFs


-Pain control PRN 


-Surgery consulted, f/u recommendations 





GI px


-PPI 





DVT px 


-Heparin SC 





DISPOSITION: F/u Surgery (Dr. Jameson's) recommendations. Plan is home when 

medically improved.





VS, I&O, 24H, Fishbone


Vital Signs/I&O





Vital Signs








  Date Time  Temp Pulse Resp B/P (MAP) Pulse Ox O2 Delivery O2 Flow Rate FiO2


 


1/27/21 06:00 97.3 84 17 145/83 (103) 97 Room Air  














I&O- Last 24 Hours up to 6 AM 


 


 1/27/21





 06:00


 


Intake Total 2400 ml


 


Output Total 1000 ml


 


Balance 1400 ml











Laboratory Data


24H LABS


Laboratory Tests 2


1/27/21 06:44: 


Nucleated Red Blood Cells % (auto) 0.0, Anion Gap 9, Glomerular Filtration Rate 

> 60.0, Calcium Level 9.3, Total Bilirubin 0.5, Aspartate Amino Transf 

(AST/SGOT) 25, Alanine Aminotransferase (ALT/SGPT) 45, Alkaline Phosphatase 67, 

Total Protein 7.2, Albumin 3.7, Albumin/Globulin Ratio 1.1L


CBC/BMP


Laboratory Tests


1/27/21 06:44











Current Medications





Current Medications








 Medications


  (Trade)  Dose


 Ordered  Sig/Gordy


 Route


 PRN Reason  Start Time


 Stop Time Status Last Admin


Dose Admin


 


 Dextrose/Sodium


 Chloride  1,000 ml @ 


 90 mls/hr  Q11H7M


 IV


   1/25/21 06:45


 1/25/21 09:04 DC 1/25/21 07:29





 


 Dextrose/Sodium


 Chloride  1,000 ml @ 


 100 mls/hr  Q10H


 IV


   1/26/21 08:30


    1/27/21 02:56





 


 Heparin Sodium


  (Porcine)


  (Heparin)  5,000 units  Q12H


 SQ


   1/26/21 21:00


     





 


 Home Med


  (Med Rec


 Complete!)    ASDIRECTED


 XX


   1/25/21 08:00


 1/25/21 07:48 DC  





 


 Hydromorphone HCl


  (Dilaudid)  0.5 mg  Q3HP  PRN


 IV


 MILD PAIN (PS 1-4)  1/25/21 06:30


     





 


 Hydromorphone HCl


  (Dilaudid)  0.8 mg  Q3HP  PRN


 IV


 MODERATE/SEVERE PAIN (PS 5-10)  1/25/21 06:30


     





 


 Ondansetron HCl


  (ZOFRAN


 INJection)  2 mg  Q4HP  PRN


 IV


 NAUSEA OR VOMITING  1/25/21 06:45


     





 


 Pantoprazole


 Sodium


  (Protonix)  40 mg  BID


 IV


   1/25/21 21:00


    1/27/21 09:54





 


 Potassium


 Chloride 10 meq/


 IV Miscellaneous


 Supplies  100 ml @ 


 100 mls/hr  Q1H


 IV


   1/27/21 09:00


 1/27/21 11:59 DC 1/27/21 11:53





 


 Potassium


 Chloride/Dextrose/


 Sod Cl  1,000 ml @ 


 100 mls/hr  Q10H


 IV


   1/25/21 09:15


 1/26/21 08:28 DC 1/26/21 06:12





 


 Sodium Chloride  1,000 ml @ 


 90 mls/hr  Q11H7M


 IV


   1/25/21 01:01


 1/25/21 06:41 DC 1/25/21 04:07














Allergies


Coded Allergies:  


     No Known Allergies (Unverified , 7/7/20)











Lourdes Patiño MD            Jan 27, 2021 13:22

## 2021-01-28 VITALS — DIASTOLIC BLOOD PRESSURE: 81 MMHG | SYSTOLIC BLOOD PRESSURE: 135 MMHG

## 2021-01-28 VITALS — DIASTOLIC BLOOD PRESSURE: 86 MMHG | SYSTOLIC BLOOD PRESSURE: 141 MMHG

## 2021-01-28 VITALS — DIASTOLIC BLOOD PRESSURE: 85 MMHG | SYSTOLIC BLOOD PRESSURE: 133 MMHG

## 2021-01-28 LAB
ALBUMIN SERPL BCG-MCNC: 3.5 GM/DL (ref 3.2–5.2)
ALT SERPL W P-5'-P-CCNC: 36 U/L (ref 12–78)
BILIRUB SERPL-MCNC: 0.5 MG/DL (ref 0.2–1)
BUN SERPL-MCNC: 6 MG/DL (ref 7–18)
CALCIUM SERPL-MCNC: 8.8 MG/DL (ref 8.5–10.1)
CHLORIDE SERPL-SCNC: 104 MEQ/L (ref 98–107)
CO2 SERPL-SCNC: 29 MEQ/L (ref 21–32)
CREAT SERPL-MCNC: 0.48 MG/DL (ref 0.55–1.3)
GFR SERPL CREATININE-BSD FRML MDRD: > 60 ML/MIN/{1.73_M2} (ref 51–?)
GLUCOSE SERPL-MCNC: 106 MG/DL (ref 70–100)
HCT VFR BLD AUTO: 34.9 % (ref 36–47)
HGB BLD-MCNC: 11.6 G/DL (ref 12–15.5)
MCH RBC QN AUTO: 29.5 PG (ref 27–33)
MCHC RBC AUTO-ENTMCNC: 33.2 G/DL (ref 32–36.5)
MCV RBC AUTO: 88.8 FL (ref 80–96)
PLATELET # BLD AUTO: 207 10^3/UL (ref 150–450)
POTASSIUM SERPL-SCNC: 3.5 MEQ/L (ref 3.5–5.1)
PROT SERPL-MCNC: 6.6 GM/DL (ref 6.4–8.2)
RBC # BLD AUTO: 3.93 10^6/UL (ref 4–5.4)
SODIUM SERPL-SCNC: 142 MEQ/L (ref 136–145)
WBC # BLD AUTO: 4.4 10^3/UL (ref 4–10)

## 2021-01-28 RX ADMIN — SODIUM CHLORIDE SCH UNITS: 4.5 INJECTION, SOLUTION INTRAVENOUS at 21:00

## 2021-01-28 RX ADMIN — SODIUM CHLORIDE SCH UNITS: 4.5 INJECTION, SOLUTION INTRAVENOUS at 08:38

## 2021-01-28 RX ADMIN — DEXTROSE AND SODIUM CHLORIDE SCH MLS/HR: 5; 450 INJECTION, SOLUTION INTRAVENOUS at 17:18

## 2021-01-28 RX ADMIN — DEXTROSE MONOHYDRATE SCH MG: 50 INJECTION, SOLUTION INTRAVENOUS at 08:36

## 2021-01-28 RX ADMIN — DEXTROSE AND SODIUM CHLORIDE SCH MLS/HR: 5; 450 INJECTION, SOLUTION INTRAVENOUS at 22:08

## 2021-01-28 RX ADMIN — DEXTROSE AND SODIUM CHLORIDE SCH MLS/HR: 5; 450 INJECTION, SOLUTION INTRAVENOUS at 00:30

## 2021-01-28 RX ADMIN — SODIUM CHLORIDE SCH UNITS: 4.5 INJECTION, SOLUTION INTRAVENOUS at 08:37

## 2021-01-28 RX ADMIN — DEXTROSE MONOHYDRATE SCH MG: 50 INJECTION, SOLUTION INTRAVENOUS at 22:08

## 2021-01-28 NOTE — IPNPDOC
Date Seen


The patient was seen on 1/28/21.





Progress Note


SUBJECTIVE: 


S/p endoscopy 1/28/21. Decision about water soluble contrast swallow vs. other 

testing to be decided on today by surgery. NG tube still in place. Denies chest 

pain, shortness of breath, nausea, abdominal pain. 





OBJECTIVE:





PHYSICAL EXAMINATION: 


VITAL SIGNS: 


Please see below 


GENERAL APPEARANCE: NAD, resting in bed, AAOx3


HEENT: NG tube in place, secured. 


NECK: symmetrical 


LUNGS: CTAB, No W/R/R


HEART: S1S2 +, no M/R/G 


ABDOMEN: Soft.  Nontender. BS + 4 quadrants


EXTREMITIES: No peripheral edema. 


NEURO: CN 2-12 intact


Psych: Mood and affected appropriate 





LABS: 


Please see below 





IMAGING: 


No new imaging 





A/P: 


 


Gastric outlet obstruction


-NG tube in place with low intermittent suction 


-Endoscopy: narrowing seen at area of pyloroplasty. water soluble contrast swal

low vs. other imaging/procedure to be decided on today by surgery


-PPI BID


-NPO, IVFs


-Pain control PRN 


-Surgery consulted, f/u recommendations 





GI px


-PPI 





DVT px 


-Heparin SC 





DISPOSITION: F/u Surgery (Dr. Jameson's) recommendations. Plan is home when 

medically improved.





VS, I&O, 24H, Fishbone


Vital Signs/I&O





Vital Signs








  Date Time  Temp Pulse Resp B/P (MAP) Pulse Ox O2 Delivery O2 Flow Rate FiO2


 


1/28/21 06:00 97.8 83 19 141/86 (104) 97 Room Air  














I&O- Last 24 Hours up to 6 AM 


 


 1/28/21





 06:00


 


Intake Total 1680 ml


 


Output Total 1250 ml


 


Balance 430 ml











Laboratory Data


24H LABS


Laboratory Tests 2


1/28/21 07:53: Nucleated Red Blood Cells % (auto) 0.0


CBC/BMP


Laboratory Tests


1/28/21 07:53











Current Medications





Current Medications








 Medications


  (Trade)  Dose


 Ordered  Sig/Gordy


 Route


 PRN Reason  Start Time


 Stop Time Status Last Admin


Dose Admin


 


 Dextrose/Sodium


 Chloride  1,000 ml @ 


 90 mls/hr  Q11H7M


 IV


   1/25/21 06:45


 1/25/21 09:04 DC 1/25/21 07:29





 


 Dextrose/Sodium


 Chloride  1,000 ml @ 


 100 mls/hr  Q10H


 IV


   1/26/21 08:30


    1/27/21 18:29





 


 Heparin Sodium


  (Porcine)


  (Heparin)  5,000 units  Q12H


 SQ


   1/26/21 21:00


     





 


 Home Med


  (Med Rec


 Complete!)    ASDIRECTED


 XX


   1/25/21 08:00


 1/25/21 07:48 DC  





 


 Hydromorphone HCl


  (Dilaudid)  0.5 mg  Q3HP  PRN


 IV


 MILD PAIN (PS 1-4)  1/25/21 06:30


     





 


 Hydromorphone HCl


  (Dilaudid)  0.8 mg  Q3HP  PRN


 IV


 MODERATE/SEVERE PAIN (PS 5-10)  1/25/21 06:30


     





 


 Ondansetron HCl


  (ZOFRAN


 INJection)  2 mg  Q4HP  PRN


 IV


 NAUSEA OR VOMITING  1/25/21 06:45


     





 


 Pantoprazole


 Sodium


  (Protonix)  40 mg  BID


 IV


   1/25/21 21:00


    1/28/21 08:36





 


 Potassium


 Chloride 10 meq/


 IV Miscellaneous


 Supplies  100 ml @ 


 100 mls/hr  Q1H


 IV


   1/27/21 09:00


 1/27/21 11:59 DC 1/27/21 11:53





 


 Potassium


 Chloride/Dextrose/


 Sod Cl  1,000 ml @ 


 100 mls/hr  Q10H


 IV


   1/25/21 09:15


 1/26/21 08:28 DC 1/26/21 06:12





 


 Sodium Chloride  1,000 ml @ 


 90 mls/hr  Q11H7M


 IV


   1/25/21 01:01


 1/25/21 06:41 DC 1/25/21 04:07














Allergies


Coded Allergies:  


     No Known Allergies (Unverified , 7/7/20)











Lourdes Patiño MD            Jan 28, 2021 09:04

## 2021-01-29 VITALS — SYSTOLIC BLOOD PRESSURE: 127 MMHG | DIASTOLIC BLOOD PRESSURE: 65 MMHG

## 2021-01-29 VITALS — SYSTOLIC BLOOD PRESSURE: 114 MMHG | DIASTOLIC BLOOD PRESSURE: 57 MMHG

## 2021-01-29 VITALS — SYSTOLIC BLOOD PRESSURE: 122 MMHG | DIASTOLIC BLOOD PRESSURE: 56 MMHG

## 2021-01-29 LAB
ALBUMIN SERPL BCG-MCNC: 3.4 GM/DL (ref 3.2–5.2)
ALT SERPL W P-5'-P-CCNC: 32 U/L (ref 12–78)
BILIRUB SERPL-MCNC: 0.6 MG/DL (ref 0.2–1)
BUN SERPL-MCNC: 6 MG/DL (ref 7–18)
CALCIUM SERPL-MCNC: 8.9 MG/DL (ref 8.5–10.1)
CHLORIDE SERPL-SCNC: 104 MEQ/L (ref 98–107)
CO2 SERPL-SCNC: 29 MEQ/L (ref 21–32)
CREAT SERPL-MCNC: 0.5 MG/DL (ref 0.55–1.3)
GFR SERPL CREATININE-BSD FRML MDRD: > 60 ML/MIN/{1.73_M2} (ref 51–?)
GLUCOSE SERPL-MCNC: 111 MG/DL (ref 70–100)
HCT VFR BLD AUTO: 34.2 % (ref 36–47)
HGB BLD-MCNC: 11.6 G/DL (ref 12–15.5)
MCH RBC QN AUTO: 30 PG (ref 27–33)
MCHC RBC AUTO-ENTMCNC: 33.9 G/DL (ref 32–36.5)
MCV RBC AUTO: 88.4 FL (ref 80–96)
PLATELET # BLD AUTO: 221 10^3/UL (ref 150–450)
POTASSIUM SERPL-SCNC: 3.3 MEQ/L (ref 3.5–5.1)
PROT SERPL-MCNC: 6.7 GM/DL (ref 6.4–8.2)
RBC # BLD AUTO: 3.87 10^6/UL (ref 4–5.4)
SODIUM SERPL-SCNC: 143 MEQ/L (ref 136–145)
WBC # BLD AUTO: 5.9 10^3/UL (ref 4–10)

## 2021-01-29 RX ADMIN — POTASSIUM CHLORIDE, DEXTROSE MONOHYDRATE AND SODIUM CHLORIDE SCH MLS/HR: 300; 5; 450 INJECTION, SOLUTION INTRAVENOUS at 11:56

## 2021-01-29 RX ADMIN — SODIUM CHLORIDE SCH UNITS: 4.5 INJECTION, SOLUTION INTRAVENOUS at 08:43

## 2021-01-29 RX ADMIN — DEXTROSE AND SODIUM CHLORIDE SCH MLS/HR: 5; 450 INJECTION, SOLUTION INTRAVENOUS at 06:30

## 2021-01-29 RX ADMIN — POTASSIUM CHLORIDE, DEXTROSE MONOHYDRATE AND SODIUM CHLORIDE SCH MLS/HR: 300; 5; 450 INJECTION, SOLUTION INTRAVENOUS at 21:02

## 2021-01-29 RX ADMIN — SODIUM CHLORIDE SCH UNITS: 4.5 INJECTION, SOLUTION INTRAVENOUS at 20:46

## 2021-01-29 RX ADMIN — METOCLOPRAMIDE SCH MG: 5 TABLET ORAL at 17:26

## 2021-01-29 RX ADMIN — DEXTROSE MONOHYDRATE SCH MG: 50 INJECTION, SOLUTION INTRAVENOUS at 20:46

## 2021-01-29 RX ADMIN — METOCLOPRAMIDE SCH MG: 5 TABLET ORAL at 13:20

## 2021-01-29 RX ADMIN — DEXTROSE MONOHYDRATE SCH MG: 50 INJECTION, SOLUTION INTRAVENOUS at 08:43

## 2021-01-29 RX ADMIN — METOCLOPRAMIDE SCH MG: 5 TABLET ORAL at 20:46

## 2021-01-29 NOTE — IPNPDOC
Date Seen


The patient was seen on 1/29/21.





Progress Note


SUBJECTIVE: 


Upper GI series: delayed emptying. Per surgery, will attempt to clamp tube and 

she how she does. If does well, CLD but if not may need to c/w NG and start IV 

nutrition, surgery. Patient denies chest pain, shortness of breath, nausea, 

abdominal pain. 





OBJECTIVE:





PHYSICAL EXAMINATION: 


VITAL SIGNS: 


Please see below 


GENERAL APPEARANCE: NAD, resting in bed, AAOx3


HEENT: NG tube in place, secured. 


NECK: symmetrical 


LUNGS: CTAB, No W/R/R


HEART: S1S2 +, no M/R/G 


ABDOMEN: thin, Soft.  Nontender. BS + 4 quadrants


EXTREMITIES: No peripheral edema. 


NEURO: CN 2-12 intact


Psych: Mood and affected appropriate 





LABS: 


Please see below 





IMAGING: 


Upper GI series: 


Delayed emptying of the stomach.  Imaging obtained 1 hour after study 

demonstrated an extremely small amount of contrast in the proximal small bowel. 

The pylorus and duodenal bulb are poorly seen.





A/P: 


 


Gastric outlet obstruction


-NG tube in place, clamped this AM


-Upper GI series above 


-Endoscopy: narrowing seen at area of pyloroplasty. 


-Per surgery, will attempt to clamp tube and she how she does. If does well, CLD

but if not may need to c/w NG and start IV nutrition, surgery


-Pain control PRN 


-Surgery consulted, f/u recommendations 





GI px


-PPI 





DVT px 


-Heparin SC 





DISPOSITION: F/u Surgery (Dr. Jameson's) recommendations. Plan is home when 

medically improved.





VS, I&O, 24H, Fishbone


Vital Signs/I&O





Vital Signs








  Date Time  Temp Pulse Resp B/P (MAP) Pulse Ox O2 Delivery O2 Flow Rate FiO2


 


1/29/21 06:00 98.6 78 16 127/65 (85) 97 Room Air  














I&O- Last 24 Hours up to 6 AM 


 


 1/29/21





 06:00


 


Intake Total 2400 ml


 


Output Total 1600 ml


 


Balance 800 ml











Laboratory Data


24H LABS


Laboratory Tests 2


1/29/21 05:01: 


Nucleated Red Blood Cells % (auto) 0.0, Anion Gap 10, Glomerular Filtration Rate

> 60.0, Calcium Level 8.9, Total Bilirubin 0.6, Aspartate Amino Transf 

(AST/SGOT) 14, Alanine Aminotransferase (ALT/SGPT) 32, Alkaline Phosphatase 62, 

Total Protein 6.7, Albumin 3.4, Albumin/Globulin Ratio 1.0L


CBC/BMP


Laboratory Tests


1/29/21 05:01











Current Medications





Current Medications








 Medications


  (Trade)  Dose


 Ordered  Sig/Gordy


 Route


 PRN Reason  Start Time


 Stop Time Status Last Admin


Dose Admin


 


 Acetaminophen


  (Tylenol Tab)  650 mg  Q4HP  PRN


 PO


 MILD PAIN or TEMP > 100.4  1/29/21 12:00


     





 


 Dextrose/Sodium


 Chloride  1,000 ml @ 


 90 mls/hr  Q11H7M


 IV


   1/25/21 06:45


 1/25/21 09:04 DC 1/25/21 07:29





 


 Dextrose/Sodium


 Chloride  1,000 ml @ 


 100 mls/hr  Q10H


 IV


   1/26/21 08:30


 1/29/21 12:06 DC 1/28/21 22:08





 


 Heparin Sodium


  (Porcine)


  (Heparin)  5,000 units  Q12H


 SQ


   1/26/21 21:00


     





 


 Home Med


  (Med Rec


 Complete!)    ASDIRECTED


 XX


   1/25/21 08:00


 1/25/21 07:48 DC  





 


 Hydromorphone HCl


  (Dilaudid)  0.5 mg  Q3HP  PRN


 IV


 MILD PAIN (PS 1-4)  1/25/21 06:30


 1/29/21 12:03 DC  





 


 Hydromorphone HCl


  (Dilaudid)  0.8 mg  Q3HP  PRN


 IV


 MODERATE/SEVERE PAIN (PS 5-10)  1/25/21 06:30


 1/29/21 12:03 DC  





 


 Metoclopramide HCl


  (Reglan)  5 mg  ACHS


 PO


   1/29/21 12:00


     





 


 Ondansetron HCl


  (ZOFRAN


 INJection)  2 mg  Q4HP  PRN


 IV


 NAUSEA OR VOMITING  1/25/21 06:45


     





 


 Pantoprazole


 Sodium


  (Protonix)  40 mg  BID


 IV


   1/25/21 21:00


    1/29/21 08:43





 


 Phenol


  (Chloraseptic


 Spray)  1 spray  Q2HP  PRN


 MT


 SORE THROAT  1/28/21 12:15


    1/28/21 22:09





 


 Potassium


 Chloride 10 meq/


 IV Miscellaneous


 Supplies  100 ml @ 


 100 mls/hr  Q1H


 IV


   1/27/21 09:00


 1/27/21 11:59 DC 1/27/21 11:53





 


 Potassium


 Chloride 10 meq/


 IV Miscellaneous


 Supplies  100 ml @ 


 100 mls/hr  Q1H


 IV


   1/29/21 09:00


 1/29/21 11:45 DC 1/29/21 09:41





 


 Potassium


 Chloride/Dextrose/


 Sod Cl  1,000 ml @ 


 100 mls/hr  Q10H


 IV


   1/25/21 09:15


 1/26/21 08:28 DC 1/26/21 06:12





 


 Potassium


 Chloride/Dextrose/


 Sod Cl  1,000 ml @ 


 100 mls/hr  Q10H


 IV


   1/29/21 11:45


    1/29/21 11:56





 


 Sodium Chloride  1,000 ml @ 


 90 mls/hr  Q11H7M


 IV


   1/25/21 01:01


 1/25/21 06:41 DC 1/25/21 04:07














Allergies


Coded Allergies:  


     No Known Allergies (Unverified , 7/7/20)











Lourdes Patiño MD            Jan 29, 2021 13:10

## 2021-01-29 NOTE — REP
INDICATION:

water soluble contrast swallow for pyloric stenosis.



COMPARISON:

None



TECHNIQUE:

This procedure was performed by Jelly Mazariegos [facility], under the direct

supervision of Dr. Blackwell.  Images were reviewed with Dr. Blackwell prior to dictation.



220 mL of a 50/50 solution of Gastrografin in saline was instilled via the patient's

NG tube, to do a limited upper GI exam.





FINDINGS:

The  film shows no organomegaly or pathological masses.  The intestinal gas

pattern is unremarkable.





The stomach walls are normally outlined.  The rugal folds are smooth and regular.

There is no gastritis, neoplasm, or ulcerative disease.  There is delayed emptying of

the stomach.  There is poor visualization of the duodenum with some contrast in the

proximal small bowel after 1 hour



IMPRESSION:

Delayed emptying of the stomach.  Imaging obtained 1 hour after study demonstrated an

extremely small amount of contrast in the proximal small bowel.  The pylorus and

duodenal bulb are poorly seen.



1.0 minutes of fluoroscopy time was utilized for this procedure.  Some fluoroscopic

images are performed with last image hold technology.  These images require no

additional radiation.



<Electronically signed by Jelly Mazariegos > 01/28/21 [id number]

<Electronically signed by Hiren Blackwell > 01/29/21 [id number]

## 2021-01-29 NOTE — IPN
PROGRESS NOTE



DATE:  01/29/2021





HISTORY:  The patient was admitted this past Sunday with a recurrence of

gastric outlet obstruction.  A upper endoscopy two days ago showed some

narrowing of her pyloroplasty, but I was able to get the scope through without

significant difficulty.  A contrast swallow yesterday showed delayed emptying,

but there was some passage of contrast through into the duodenum.  Her NG tube

was clamped overnight; and when un-clamped and put to suction for half an hour

to an hour this morning, she drained only 50 ml of very lightly bilious fluid.

She denies any pain.  



Vital signs show that she has been afebrile with a pulse in the 70s to 80s and

a good blood pressure.  



Intake and output show that yesterday she had 2400 in with 1700 out.  



PHYSICAL EXAMINATION:  The patient is awake and alert.  She denies any

abdominal pain.  Heart and lung examination is unremarkable.  The abdomen is

thin and flat and soft and nontender.  



LABORATORY STUDIES:  Show that her white count is 6 with a hemoglobin of 12,

hematocrit 34 and platelet count of 221,000,  Her chemistry profile shows a

sodium of 143, potassium 3.3, chloride 104, Co2 of 29, BUN of 6, creatinine 0.5

and a glucose of 111.  Liver function tests are normal.  Protein and albumin

are still within normal limits.  



IMPRESSION:  The patient is doing very well.  She had no difficulty overnight

with her NG tube clamped and had only a minimal residual identified this

morning.  



PLAN:  I will remove her NG tube and this accomplished easily.  I will start

her on some clear liquids.  If she can tolerate adequate clear liquids without

a recurrence of nausea or vomiting or abdominal pain, then she could

conceivably be discharged home I counseled her that she will require additional

surgery.  I do not believe that we are going to be a lasting good result out of

trying to balloon dilate her pyloroplasty.  I have recommended that we plan on

proceeding with a gastrojejunostomy for gastric drainage.  I will try to

schedule this as soon as possible.  If this can be done very soon, we may be

able to keep her in the hospital until the surgery.  If it will be later, then

it would be reasonable for her to be discharged on clear liquids as long as she

is tolerating these well and return for the surgery.

## 2021-01-29 NOTE — IPN
PROGRESS NOTE



DATE:  01/28/2021



HISTORY:  Patient was readmitted this past weekend with evidence for recurrent

gastric outlet obstruction despite her pyloroplasty about a month ago.  She had

a contrast swallow today, which shows certainly delayed emptying of her

stomach.  Her nasogastric (NG) output, however, seems to be decreased and she

denies any pain.



Vital signs show that she has been afebrile over the past 24 hours with a pulse

in the 70s and low 80s and a blood pressure that is acceptable.



Intake and output show that yesterday she had a 1700 in with 850 mL of urine

recorded out.  



PHYSICAL EXAMINATION:   

Patient is alert and oriented.  The NG tube is becoming more uncomfortable and

she is hoarse today.  

HEART AND LUNG EXAM:  Unremarkable.

ABDOMEN:  Soft, nontender and non-distended. 



LABORATORY STUDIES:

Today show a white count of 4, hemoglobin 12, hematocrit 35 and a platelet

count of 207,000.



Chemistries show normal electrolytes with a BUN of 6, creatinine 0.5 and a

glucose of 106.  



Liver function tests are normal.  



IMPRESSION:  Patient has recurrence of a gastric outlet obstruction, despite

her pyloroplasty about a month ago.  On her endoscopy yesterday, I was able to

advance the scope through the area of the pyloroplasty, but it did appear

somewhat angled and narrowed.  She has a quite dilated stomach.  It may well be

that her pyloroplasty is just not going to be adequate.  Her gastric motility

is probably diminished following her vagotomy as well.  



PLAN:  I will try clamping her NG tube overnight and see if she develops a

residual.  If not, I will consider removing her NG tube and letting her go back

to a diet that is entirely clear liquids with no solid food whatsoever.  This

may allow us to temporize us for time until we can get her scheduled for a

gastrojejunostomy for better gastric drainage.  If she fails the trial of NG

clamping, the we may need to put her back on intravenous nutrition while we

prepare her for repeat surgery.

## 2021-01-30 VITALS — SYSTOLIC BLOOD PRESSURE: 114 MMHG | DIASTOLIC BLOOD PRESSURE: 61 MMHG

## 2021-01-30 VITALS — SYSTOLIC BLOOD PRESSURE: 114 MMHG | DIASTOLIC BLOOD PRESSURE: 57 MMHG

## 2021-01-30 VITALS — DIASTOLIC BLOOD PRESSURE: 62 MMHG | SYSTOLIC BLOOD PRESSURE: 130 MMHG

## 2021-01-30 LAB
ALBUMIN SERPL BCG-MCNC: 3.2 GM/DL (ref 3.2–5.2)
ALT SERPL W P-5'-P-CCNC: 26 U/L (ref 12–78)
BILIRUB SERPL-MCNC: 0.5 MG/DL (ref 0.2–1)
BUN SERPL-MCNC: 6 MG/DL (ref 7–18)
CALCIUM SERPL-MCNC: 8.8 MG/DL (ref 8.5–10.1)
CHLORIDE SERPL-SCNC: 107 MEQ/L (ref 98–107)
CO2 SERPL-SCNC: 29 MEQ/L (ref 21–32)
CREAT SERPL-MCNC: 0.54 MG/DL (ref 0.55–1.3)
GFR SERPL CREATININE-BSD FRML MDRD: > 60 ML/MIN/{1.73_M2} (ref 51–?)
GLUCOSE SERPL-MCNC: 107 MG/DL (ref 70–100)
HCT VFR BLD AUTO: 33.2 % (ref 36–47)
HGB BLD-MCNC: 11.1 G/DL (ref 12–15.5)
MCH RBC QN AUTO: 29.8 PG (ref 27–33)
MCHC RBC AUTO-ENTMCNC: 33.4 G/DL (ref 32–36.5)
MCV RBC AUTO: 89 FL (ref 80–96)
PLATELET # BLD AUTO: 209 10^3/UL (ref 150–450)
POTASSIUM SERPL-SCNC: 4.2 MEQ/L (ref 3.5–5.1)
PROT SERPL-MCNC: 6.1 GM/DL (ref 6.4–8.2)
RBC # BLD AUTO: 3.73 10^6/UL (ref 4–5.4)
SODIUM SERPL-SCNC: 143 MEQ/L (ref 136–145)
WBC # BLD AUTO: 3.7 10^3/UL (ref 4–10)

## 2021-01-30 RX ADMIN — METOCLOPRAMIDE SCH MG: 5 TABLET ORAL at 17:12

## 2021-01-30 RX ADMIN — PANTOPRAZOLE SODIUM SCH MG: 40 TABLET, DELAYED RELEASE ORAL at 08:05

## 2021-01-30 RX ADMIN — METOCLOPRAMIDE SCH MG: 5 TABLET ORAL at 20:00

## 2021-01-30 RX ADMIN — DEXTROSE AND SODIUM CHLORIDE SCH MLS/HR: 5; 450 INJECTION, SOLUTION INTRAVENOUS at 10:13

## 2021-01-30 RX ADMIN — SODIUM CHLORIDE SCH UNITS: 4.5 INJECTION, SOLUTION INTRAVENOUS at 19:59

## 2021-01-30 RX ADMIN — PANTOPRAZOLE SODIUM SCH MG: 40 TABLET, DELAYED RELEASE ORAL at 20:00

## 2021-01-30 RX ADMIN — METOCLOPRAMIDE SCH MG: 5 TABLET ORAL at 12:08

## 2021-01-30 RX ADMIN — METOCLOPRAMIDE SCH MG: 5 TABLET ORAL at 08:05

## 2021-01-30 RX ADMIN — SODIUM CHLORIDE SCH UNITS: 4.5 INJECTION, SOLUTION INTRAVENOUS at 08:05

## 2021-01-30 NOTE — IPNPDOC
Date Seen


The patient was seen on 1/30/21.





Progress Note


SUBJECTIVE: 


Per surgery, will see how she does on CLD and see if she tolerates. If she does 

then possible d/c home to f/u with Dr. Jameson. Patient denies chest pain, 

shortness of breath, nausea, abdominal pain. 





OBJECTIVE:





PHYSICAL EXAMINATION: 


VITAL SIGNS: 


Please see below 


GENERAL APPEARANCE: NAD, resting in bed, AAOx3


HEENT: EOMI, PERRLA 


NECK: symmetrical 


LUNGS: CTAB, No W/R/R


HEART: S1S2 +, no M/R/G 


ABDOMEN: thin, Soft.  Nontender. BS + 4 quadrants


EXTREMITIES: No peripheral edema. 


NEURO: CN 2-12 intact


Psych: Mood and affected appropriate 





LABS: 


Please see below 





IMAGING: 


Upper GI series: 


Delayed emptying of the stomach.  Imaging obtained 1 hour after study demonstrat

ed an extremely small amount of contrast in the proximal small bowel.  The 

pylorus and duodenal bulb are poorly seen.





A/P: 


 


Gastric outlet obstruction, hx of vagotomy and pyloroplasty 


-NG tube in place, clamped this AM


-Upper GI series above 


-Endoscopy: narrowing seen at area of pyloroplasty. 


-CLD and will see if she tolerates. If she does then possible d/c home to f/u 

with Dr. Jameson. 


-Pain control PRN 


-F/u surgery recommendations 





GI px


-PPI 





DVT px 


-Heparin SC 





DISPOSITION: F/u Surgery recommendations. Plan is home when medically improved.





VS, I&O, 24H, Fishbone


Vital Signs/I&O





Vital Signs








  Date Time  Temp Pulse Resp B/P (MAP) Pulse Ox O2 Delivery O2 Flow Rate FiO2


 


1/30/21 06:00 98.2 80 17 114/57 (76) 98 Room Air  














I&O- Last 24 Hours up to 6 AM 


 


 1/30/21





 06:00


 


Intake Total 2670 ml


 


Output Total 1700 ml


 


Balance 970 ml











Laboratory Data


24H LABS


Laboratory Tests 2


1/30/21 06:00: 


Nucleated Red Blood Cells % (auto) 0.0, Anion Gap 7L, Glomerular Filtration Rate

> 60.0, Calcium Level 8.8, Total Bilirubin 0.5, Aspartate Amino Transf 

(AST/SGOT) 12, Alanine Aminotransferase (ALT/SGPT) 26, Alkaline Phosphatase 52, 

Total Protein 6.1L, Albumin 3.2, Albumin/Globulin Ratio 1.1L


CBC/BMP


Laboratory Tests


1/30/21 06:00











Current Medications





Current Medications








 Medications


  (Trade)  Dose


 Ordered  Sig/Gordy


 Route


 PRN Reason  Start Time


 Stop Time Status Last Admin


Dose Admin


 


 Acetaminophen


  (Tylenol Tab)  650 mg  Q4HP  PRN


 PO


 MILD PAIN or TEMP > 100.4  1/29/21 12:00


     





 


 Dextrose/Sodium


 Chloride  1,000 ml @ 


 50 mls/hr  Q20H


 IV


   1/30/21 09:30


    1/30/21 10:13





 


 Dextrose/Sodium


 Chloride  1,000 ml @ 


 90 mls/hr  Q11H7M


 IV


   1/25/21 06:45


 1/25/21 09:04 DC 1/25/21 07:29





 


 Dextrose/Sodium


 Chloride  1,000 ml @ 


 100 mls/hr  Q10H


 IV


   1/26/21 08:30


 1/29/21 12:06 DC 1/28/21 22:08





 


 Heparin Sodium


  (Porcine)


  (Heparin)  5,000 units  Q12H


 SQ


   1/26/21 21:00


     





 


 Home Med


  (Med Rec


 Complete!)    ASDIRECTED


 XX


   1/25/21 08:00


 1/25/21 07:48 DC  





 


 Hydromorphone HCl


  (Dilaudid)  0.5 mg  Q3HP  PRN


 IV


 MILD PAIN (PS 1-4)  1/25/21 06:30


 1/29/21 12:03 DC  





 


 Hydromorphone HCl


  (Dilaudid)  0.8 mg  Q3HP  PRN


 IV


 MODERATE/SEVERE PAIN (PS 5-10)  1/25/21 06:30


 1/29/21 12:03 DC  





 


 Metoclopramide HCl


  (Reglan)  5 mg  ACHS


 PO


   1/29/21 12:00


    1/30/21 12:08





 


 Ondansetron HCl


  (ZOFRAN


 INJection)  2 mg  Q4HP  PRN


 IV


 NAUSEA OR VOMITING  1/25/21 06:45


     





 


 Pantoprazole


 Sodium


  (Protonix)  40 mg  BID


 IV


   1/25/21 21:00


 1/30/21 07:45 DC 1/29/21 20:46





 


 Pantoprazole


 Sodium


  (Protonix)  40 mg  BID


 PO


   1/30/21 09:00


    1/30/21 08:05





 


 Phenol


  (Chloraseptic


 Spray)  1 spray  Q2HP  PRN


 MT


 SORE THROAT  1/28/21 12:15


    1/28/21 22:09





 


 Potassium


 Chloride 10 meq/


 IV Miscellaneous


 Supplies  100 ml @ 


 100 mls/hr  Q1H


 IV


   1/27/21 09:00


 1/27/21 11:59 DC 1/27/21 11:53





 


 Potassium


 Chloride 10 meq/


 IV Miscellaneous


 Supplies  100 ml @ 


 100 mls/hr  Q1H


 IV


   1/29/21 09:00


 1/29/21 11:45 DC 1/29/21 09:41





 


 Potassium


 Chloride/Dextrose/


 Sod Cl  1,000 ml @ 


 100 mls/hr  Q10H


 IV


   1/25/21 09:15


 1/26/21 08:28 DC 1/26/21 06:12





 


 Potassium


 Chloride/Dextrose/


 Sod Cl  1,000 ml @ 


 100 mls/hr  Q10H


 IV


   1/29/21 11:45


 1/30/21 09:33 DC 1/29/21 21:02





 


 Sodium Chloride  1,000 ml @ 


 90 mls/hr  Q11H7M


 IV


   1/25/21 01:01


 1/25/21 06:41 DC 1/25/21 04:07














Allergies


Coded Allergies:  


     No Known Allergies (Unverified , 7/7/20)











Lourdes Patiño MD            Jan 30, 2021 15:05

## 2021-01-30 NOTE — IPN
PROGRESS NOTE



DATE:  01/30/2021



SUBJECTIVE:  The patient states that she is not having any abdominal pain.  No

nausea, no vomiting.  Tolerating clears.  Had a bowel movement this morning and

otherwise feeling better overall.  She has been tolerating clears, but she

states that she has been really kind of sipping on it and not taking a great

deal of this, but she is not getting nauseated either when she takes it in.  



PHYSICAL EXAMINATION:   

ABDOMEN:  Soft and pretty much scaphoid at this time.  Non-distended and

nontender.



IMPRESSION/PLAN:  Patient has evidence of gastric emptying problems with

chronically distended stomach over time.  She had a vagotomy and pyloroplasty,

but it really has not made a significant improvement of her emptying per se and

thus, at this point, the plan is to keep her on clear liquids and possibly

discharge her home on some clear liquids and proceed with operation later on in

the week, depending on her overall improvement/status.  We will see how she

does over the next 24 hours and making sure she is taking adequate oral intake

and if she is, then I feel that we can probably discharge her home with follow

up in Dr. Jameson's office on Monday for discussion of the operative

intervention.

## 2021-01-31 VITALS — DIASTOLIC BLOOD PRESSURE: 75 MMHG | SYSTOLIC BLOOD PRESSURE: 127 MMHG

## 2021-01-31 LAB
ALBUMIN SERPL BCG-MCNC: 3.2 GM/DL (ref 3.2–5.2)
ALT SERPL W P-5'-P-CCNC: 25 U/L (ref 12–78)
BILIRUB SERPL-MCNC: 0.5 MG/DL (ref 0.2–1)
BUN SERPL-MCNC: 7 MG/DL (ref 7–18)
CALCIUM SERPL-MCNC: 8.9 MG/DL (ref 8.5–10.1)
CHLORIDE SERPL-SCNC: 103 MEQ/L (ref 98–107)
CO2 SERPL-SCNC: 32 MEQ/L (ref 21–32)
CREAT SERPL-MCNC: 0.56 MG/DL (ref 0.55–1.3)
GFR SERPL CREATININE-BSD FRML MDRD: > 60 ML/MIN/{1.73_M2} (ref 51–?)
GLUCOSE SERPL-MCNC: 90 MG/DL (ref 70–100)
HCT VFR BLD AUTO: 33.4 % (ref 36–47)
HGB BLD-MCNC: 11.3 G/DL (ref 12–15.5)
MCH RBC QN AUTO: 29.9 PG (ref 27–33)
MCHC RBC AUTO-ENTMCNC: 33.8 G/DL (ref 32–36.5)
MCV RBC AUTO: 88.4 FL (ref 80–96)
PLATELET # BLD AUTO: 220 10^3/UL (ref 150–450)
POTASSIUM SERPL-SCNC: 3.4 MEQ/L (ref 3.5–5.1)
PROT SERPL-MCNC: 6.3 GM/DL (ref 6.4–8.2)
RBC # BLD AUTO: 3.78 10^6/UL (ref 4–5.4)
SODIUM SERPL-SCNC: 144 MEQ/L (ref 136–145)
WBC # BLD AUTO: 3.9 10^3/UL (ref 4–10)

## 2021-01-31 RX ADMIN — SODIUM CHLORIDE SCH UNITS: 4.5 INJECTION, SOLUTION INTRAVENOUS at 09:00

## 2021-01-31 RX ADMIN — PANTOPRAZOLE SODIUM SCH MG: 40 TABLET, DELAYED RELEASE ORAL at 08:28

## 2021-01-31 RX ADMIN — DEXTROSE AND SODIUM CHLORIDE SCH MLS/HR: 5; 450 INJECTION, SOLUTION INTRAVENOUS at 02:39

## 2021-01-31 RX ADMIN — METOCLOPRAMIDE SCH MG: 5 TABLET ORAL at 08:28

## 2021-01-31 NOTE — DS.PDOC
Discharge Summary


General


Date of Admission


Jan 25, 2021 at 01:07


Date of Discharge


1/31/21


Attending Physician:  Lourdes Patiño MD





Discharge Summary


HISTORY OF PRESENT ILLNESS: 


Patient is a 50 yr old F who  underwent lap truncal vagotomy with pyloroplasty 

in December 2020 to manage pyloric stenosis 2/2 PUD with gastric outlet 

obstruction. On 1/22/21 in evening after eating dinner she had an abnormal 

sensation in her abdomen and felt like she would not be able to digest her food.

The next day she developed left mid & lower abdominal discomfort and nausea; she

didnt eat the whole day because she felt to bloated, but she did have a BM. On 

on 1/24/21 her abdomen begun to feel more tight and she vomited brow fluid 

which she describes as being smooth and thick in texture. She didnt notice 

if it was foul in smelling. She denies having fevers but had an episode of chill

s and sweating which she attributes to not eating for the last few days. She 

then went to [facility] to be evaluated further. Work-up at [facility]: WBC 7.0, Hg 14.5, Plt 274, Na 138, K 3.7,Cl 94, CO2 30, BUN 24, 

Cr 0.7, Glucose 130, Lipase 48, lactic acid 2.8, CT abd/pelvis  Marked fluid 

distention. 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 

possibly due to volvulus and an underlying mass for a lead point is not 

excluded. Mild hiatal hernia. Providers at [facility] requested transfer for a 

higher level of care on 1/25/21. 





HOSPITAL COURSE: 


NG tube was placed on arrival and patient was kept NPO upon evaluation by 

surgery. Endoscopy was done on 1/27/21, surgery was able to advance the scope 

through the area of the pyloroplasty, but it did appear somewhat angled and 

narrowed.  She has a quite dilated stomach.  It may well be


that her pyloroplasty is just not going to be adequate.  Her gastric motility is

probably diminished following her vagotomy as well.  Patient has recurrence of a

gastric outlet obstruction, despite her pyloroplasty about a month ago. NG tube 

was later clamped and she tolerated a CLD well over the past 24 hours. 


Per surgery, she was cleared for discharge today to continue CLD as o/p. She has

f/u with Dr. Jameson this week and they will schedule for a gastrojejunostomy 

for better gastric drainage. At time of discharge, patient denies chest pain, 

n/v/d, fevers, chills. 





PAST MEDICAL/ SURGICAL HISTORY:


PUD


gastric outlet obstruction 


Tubal ligation


Laproscopic truncal vagotomy with pyloroplasty





SOCIAL HISTORY:


Quit smoking in March of 2020. 


Doesnt drink or use recreational drugs


 dependent





FAMILY HISTORY:


CHF, CKD, HTN, DM


   


ALLERGIES: 


Please see below. 





DISCHARGE MEDICATIONS: 


Please see below.





PHYSICAL EXAMINATION: 


VITAL SIGNS: 


Please see below 


GENERAL APPEARANCE: NAD, resting in bed, AAOx3


HEENT: EOMI, PERRLA 


NECK: symmetrical 


LUNGS: CTAB, No W/R/R


HEART: S1S2 +, no M/R/G 


ABDOMEN: thin, Soft.  Nontender. BS + 4 quadrants


EXTREMITIES: No peripheral edema. 


NEURO: CN 2-12 intact


Psych: Mood and affected appropriate 





LABS: 


Please see below 





IMAGING: 


Upper GI series: 


Delayed emptying of the stomach.  Imaging obtained 1 hour after study 

demonstrated an extremely small amount of contrast in the proximal small bowel. 

The pylorus and duodenal bulb are poorly seen.





A/P: 


 


Gastric outlet obstruction, hx of vagotomy and pyloroplasty 


-Tolerating CLD well, ensure clear TID with meals  


-D/c today to f/u with Dr. Jameson later this week. 





PUD 


-PPI BID





DISPOSITION: Discharged home on CLD, Ensure clear TId with meals to f/u with Dr. Jameson this week. 





TIME SPENT ON DISCHARGE: Greater than 30 minutes.





Vital Signs/I&Os





Vital Signs








  Date Time  Temp Pulse Resp B/P (MAP) Pulse Ox O2 Delivery O2 Flow Rate FiO2


 


1/31/21 06:00 98.0 81 18 127/75 (92) 97 Room Air  














I&O- Last 24 Hours up to 6 AM 


 


 1/31/21





 06:00


 


Intake Total 3330 ml


 


Output Total 2400 ml


 


Balance 930 ml











Laboratory Data


Labs 24H


Laboratory Tests 2


1/31/21 06:23: 


Nucleated Red Blood Cells % (auto) 0.0, Anion Gap 9, Glomerular Filtration Rate 

> 60.0, Calcium Level 8.9, Total Bilirubin 0.5, Aspartate Amino Transf 

(AST/SGOT) 13, Alanine Aminotransferase (ALT/SGPT) 25, Alkaline Phosphatase 55, 

Total Protein 6.3L, Albumin 3.2, Albumin/Globulin Ratio 1.0L


CBC/BMP


Laboratory Tests


1/31/21 06:23











Discharge Medications


Scheduled


Metoclopramide HCl (Metoclopramide HCl) 5 Mg Tablet, 5 MG PO ACHS


Pantoprazole Sodium (Pantoprazole Sodium) 40 Mg Tablet.dr, 40 MG PO BID, 

(Reported)





Scheduled PRN


Acetaminophen (Acetaminophen) 325 Mg Tablet, 650 MG PO Q6HP PRN for MILD PAIN or

TEMP > 100.4





Allergies


Coded Allergies:  


     No Known Allergies (Unverified , 7/7/20)





Current Medications





Current Medications








 Medications


  (Trade)  Dose


 Ordered  Sig/Gordy


 Route


 PRN Reason  Start Time


 Stop Time Status Last Admin


Dose Admin


 


 Acetaminophen


  (Tylenol Tab)  650 mg  Q4HP  PRN


 PO


 MILD PAIN or TEMP > 100.4  1/29/21 12:00


 1/31/21 12:38 DC  





 


 Dextrose/Sodium


 Chloride  1,000 ml @ 


 50 mls/hr  Q20H


 IV


   1/30/21 09:30


 1/31/21 12:38 DC 1/31/21 02:39





 


 Dextrose/Sodium


 Chloride  1,000 ml @ 


 90 mls/hr  Q11H7M


 IV


   1/25/21 06:45


 1/25/21 09:04 DC 1/25/21 07:29





 


 Dextrose/Sodium


 Chloride  1,000 ml @ 


 100 mls/hr  Q10H


 IV


   1/26/21 08:30


 1/29/21 12:06 DC 1/28/21 22:08





 


 Heparin Sodium


  (Porcine)


  (Heparin)  5,000 units  Q12H


 SQ


   1/26/21 21:00


 1/31/21 12:38 DC  





 


 Home Med


  (Med Rec


 Complete!)    ASDIRECTED


 XX


   1/25/21 08:00


 1/25/21 07:48 DC  





 


 Hydromorphone HCl


  (Dilaudid)  0.5 mg  Q3HP  PRN


 IV


 MILD PAIN (PS 1-4)  1/25/21 06:30


 1/29/21 12:03 DC  





 


 Hydromorphone HCl


  (Dilaudid)  0.8 mg  Q3HP  PRN


 IV


 MODERATE/SEVERE PAIN (PS 5-10)  1/25/21 06:30


 1/29/21 12:03 DC  





 


 Metoclopramide HCl


  (Reglan)  5 mg  ACHS


 PO


   1/29/21 12:00


 1/31/21 12:38 DC 1/31/21 08:28





 


 Ondansetron HCl


  (ZOFRAN


 INJection)  2 mg  Q4HP  PRN


 IV


 NAUSEA OR VOMITING  1/25/21 06:45


 1/31/21 12:38 DC  





 


 Pantoprazole


 Sodium


  (Protonix)  40 mg  BID


 IV


   1/25/21 21:00


 1/30/21 07:45 DC 1/29/21 20:46





 


 Pantoprazole


 Sodium


  (Protonix)  40 mg  BID


 PO


   1/30/21 09:00


 1/31/21 12:38 DC 1/31/21 08:28





 


 Phenol


  (Chloraseptic


 Spray)  1 spray  Q2HP  PRN


 MT


 SORE THROAT  1/28/21 12:15


 1/31/21 12:38 DC 1/28/21 22:09





 


 Potassium


 Chloride 10 meq/


 IV Miscellaneous


 Supplies  100 ml @ 


 100 mls/hr  Q1H


 IV


   1/27/21 09:00


 1/27/21 11:59 DC 1/27/21 11:53





 


 Potassium


 Chloride 10 meq/


 IV Miscellaneous


 Supplies  100 ml @ 


 100 mls/hr  Q1H


 IV


   1/29/21 09:00


 1/29/21 11:45 DC 1/29/21 09:41





 


 Potassium


 Chloride/Dextrose/


 Sod Cl  1,000 ml @ 


 100 mls/hr  Q10H


 IV


   1/25/21 09:15


 1/26/21 08:28 DC 1/26/21 06:12





 


 Potassium


 Chloride/Dextrose/


 Sod Cl  1,000 ml @ 


 100 mls/hr  Q10H


 IV


   1/29/21 11:45


 1/30/21 09:33 DC 1/29/21 21:02





 


 Sodium Chloride  1,000 ml @ 


 90 mls/hr  Q11H7M


 IV


   1/25/21 01:01


 1/25/21 06:41 DC 1/25/21 04:07




















Lourdes Patiño MD            Jan 31, 2021 15:50

## 2021-02-01 ENCOUNTER — HOSPITAL ENCOUNTER (OUTPATIENT)
Dept: HOSPITAL 53 - M LABSMTC | Age: 51
End: 2021-02-01
Attending: ANESTHESIOLOGY
Payer: COMMERCIAL

## 2021-02-01 DIAGNOSIS — Z01.812: Primary | ICD-10-CM

## 2021-02-01 DIAGNOSIS — Z20.822: ICD-10-CM

## 2021-02-04 ENCOUNTER — HOSPITAL ENCOUNTER (INPATIENT)
Dept: HOSPITAL 53 - M OR | Age: 51
LOS: 4 days | Discharge: HOME | DRG: 328 | End: 2021-02-08
Attending: SURGERY | Admitting: SURGERY
Payer: COMMERCIAL

## 2021-02-04 VITALS — WEIGHT: 107.81 LBS | BODY MASS INDEX: 19.84 KG/M2 | HEIGHT: 62 IN

## 2021-02-04 VITALS — SYSTOLIC BLOOD PRESSURE: 111 MMHG | DIASTOLIC BLOOD PRESSURE: 66 MMHG

## 2021-02-04 VITALS — DIASTOLIC BLOOD PRESSURE: 68 MMHG | SYSTOLIC BLOOD PRESSURE: 125 MMHG

## 2021-02-04 VITALS — SYSTOLIC BLOOD PRESSURE: 119 MMHG | DIASTOLIC BLOOD PRESSURE: 69 MMHG

## 2021-02-04 VITALS — DIASTOLIC BLOOD PRESSURE: 63 MMHG | SYSTOLIC BLOOD PRESSURE: 119 MMHG

## 2021-02-04 VITALS — SYSTOLIC BLOOD PRESSURE: 118 MMHG | DIASTOLIC BLOOD PRESSURE: 67 MMHG

## 2021-02-04 VITALS — SYSTOLIC BLOOD PRESSURE: 115 MMHG | DIASTOLIC BLOOD PRESSURE: 60 MMHG

## 2021-02-04 VITALS — DIASTOLIC BLOOD PRESSURE: 66 MMHG | SYSTOLIC BLOOD PRESSURE: 122 MMHG

## 2021-02-04 VITALS — DIASTOLIC BLOOD PRESSURE: 69 MMHG | SYSTOLIC BLOOD PRESSURE: 119 MMHG

## 2021-02-04 DIAGNOSIS — K31.1: Primary | ICD-10-CM

## 2021-02-04 PROCEDURE — 0D164JA BYPASS STOMACH TO JEJUNUM WITH SYNTHETIC SUBSTITUTE, PERCUTANEOUS ENDOSCOPIC APPROACH: ICD-10-PCS | Performed by: SURGERY

## 2021-02-04 PROCEDURE — 0DBA4ZZ EXCISION OF JEJUNUM, PERCUTANEOUS ENDOSCOPIC APPROACH: ICD-10-PCS | Performed by: SURGERY

## 2021-02-04 PROCEDURE — 8E0W4CZ ROBOTIC ASSISTED PROCEDURE OF TRUNK REGION, PERCUTANEOUS ENDOSCOPIC APPROACH: ICD-10-PCS | Performed by: SURGERY

## 2021-02-04 RX ADMIN — ONDANSETRON PRN MG: 2 INJECTION INTRAMUSCULAR; INTRAVENOUS at 22:28

## 2021-02-04 RX ADMIN — ONDANSETRON PRN MG: 2 INJECTION INTRAMUSCULAR; INTRAVENOUS at 12:16

## 2021-02-04 RX ADMIN — SODIUM CHLORIDE, POTASSIUM CHLORIDE, SODIUM LACTATE AND CALCIUM CHLORIDE SCH MLS/HR: 600; 310; 30; 20 INJECTION, SOLUTION INTRAVENOUS at 21:58

## 2021-02-04 RX ADMIN — SODIUM CHLORIDE, POTASSIUM CHLORIDE, SODIUM LACTATE AND CALCIUM CHLORIDE SCH MLS/HR: 600; 310; 30; 20 INJECTION, SOLUTION INTRAVENOUS at 12:23

## 2021-02-04 RX ADMIN — CEFAZOLIN SODIUM SCH MLS/HR: 1 INJECTION, POWDER, FOR SOLUTION INTRAMUSCULAR; INTRAVENOUS at 16:38

## 2021-02-04 RX ADMIN — DEXTROSE MONOHYDRATE SCH MG: 50 INJECTION, SOLUTION INTRAVENOUS at 21:20

## 2021-02-05 VITALS — SYSTOLIC BLOOD PRESSURE: 112 MMHG | DIASTOLIC BLOOD PRESSURE: 69 MMHG

## 2021-02-05 VITALS — DIASTOLIC BLOOD PRESSURE: 55 MMHG | SYSTOLIC BLOOD PRESSURE: 117 MMHG

## 2021-02-05 VITALS — SYSTOLIC BLOOD PRESSURE: 112 MMHG | DIASTOLIC BLOOD PRESSURE: 59 MMHG

## 2021-02-05 VITALS — DIASTOLIC BLOOD PRESSURE: 56 MMHG | SYSTOLIC BLOOD PRESSURE: 106 MMHG

## 2021-02-05 VITALS — DIASTOLIC BLOOD PRESSURE: 69 MMHG | SYSTOLIC BLOOD PRESSURE: 122 MMHG

## 2021-02-05 VITALS — DIASTOLIC BLOOD PRESSURE: 67 MMHG | SYSTOLIC BLOOD PRESSURE: 120 MMHG

## 2021-02-05 LAB
BASOPHILS # BLD AUTO: 0 10^3/UL (ref 0–0.2)
BASOPHILS NFR BLD AUTO: 0.3 % (ref 0–1)
BUN SERPL-MCNC: 11 MG/DL (ref 7–18)
CALCIUM SERPL-MCNC: 8.1 MG/DL (ref 8.5–10.1)
CHLORIDE SERPL-SCNC: 107 MEQ/L (ref 98–107)
CO2 SERPL-SCNC: 21 MEQ/L (ref 21–32)
CREAT SERPL-MCNC: 0.54 MG/DL (ref 0.55–1.3)
EOSINOPHIL # BLD AUTO: 0 10^3/UL (ref 0–0.5)
EOSINOPHIL NFR BLD AUTO: 0.4 % (ref 0–3)
GFR SERPL CREATININE-BSD FRML MDRD: > 60 ML/MIN/{1.73_M2} (ref 51–?)
GLUCOSE SERPL-MCNC: 70 MG/DL (ref 70–100)
HCT VFR BLD AUTO: 31.5 % (ref 36–47)
HGB BLD-MCNC: 10.4 G/DL (ref 12–15.5)
LYMPHOCYTES # BLD AUTO: 1.2 10^3/UL (ref 1.5–5)
LYMPHOCYTES NFR BLD AUTO: 15.8 % (ref 24–44)
MCH RBC QN AUTO: 29.2 PG (ref 27–33)
MCHC RBC AUTO-ENTMCNC: 33 G/DL (ref 32–36.5)
MCV RBC AUTO: 88.5 FL (ref 80–96)
MONOCYTES # BLD AUTO: 0.6 10^3/UL (ref 0–0.8)
MONOCYTES NFR BLD AUTO: 8.2 % (ref 0–5)
NEUTROPHILS # BLD AUTO: 5.6 10^3/UL (ref 1.5–8.5)
NEUTROPHILS NFR BLD AUTO: 74.9 % (ref 36–66)
PLATELET # BLD AUTO: 259 10^3/UL (ref 150–450)
POTASSIUM SERPL-SCNC: 3.4 MEQ/L (ref 3.5–5.1)
RBC # BLD AUTO: 3.56 10^6/UL (ref 4–5.4)
SODIUM SERPL-SCNC: 139 MEQ/L (ref 136–145)
WBC # BLD AUTO: 7.4 10^3/UL (ref 4–10)

## 2021-02-05 RX ADMIN — SODIUM CHLORIDE, POTASSIUM CHLORIDE, SODIUM LACTATE AND CALCIUM CHLORIDE SCH MLS/HR: 600; 310; 30; 20 INJECTION, SOLUTION INTRAVENOUS at 13:17

## 2021-02-05 RX ADMIN — DEXTROSE MONOHYDRATE SCH MG: 50 INJECTION, SOLUTION INTRAVENOUS at 21:13

## 2021-02-05 RX ADMIN — SODIUM CHLORIDE, POTASSIUM CHLORIDE, SODIUM LACTATE AND CALCIUM CHLORIDE SCH MLS/HR: 600; 310; 30; 20 INJECTION, SOLUTION INTRAVENOUS at 05:56

## 2021-02-05 RX ADMIN — DEXTROSE MONOHYDRATE SCH MG: 50 INJECTION, SOLUTION INTRAVENOUS at 08:55

## 2021-02-05 RX ADMIN — CEFAZOLIN SODIUM SCH MLS/HR: 1 INJECTION, POWDER, FOR SOLUTION INTRAMUSCULAR; INTRAVENOUS at 00:36

## 2021-02-05 NOTE — IPN
PROGRESS NOTE



DATE:  02/05/2021



HISTORY: Patient is now postoperative day #1 from a robotic-assisted

laparoscopic Jessica-en-Y gastrojejunostomy for recurrent gastric outlet

obstruction. She denies significant pain. She denies any nausea or vomiting. 



Vital signs show that she has been afebrile since surgery. Her pulse is in the

80s and low 90s, and her blood pressure is excellent. 



Intake and output show that yesterday she had  total of 2800 in with 1520 out. 



PHYSICAL EXAMINATION:  The patient is awake and alert. She appears fairly

comfortable. She is sitting up on the edge of the bed when I come in. Her

nasogastric tube is in place, and it has had no output. Heart exam shows a

regular rate and rhythm. Lungs are clear to auscultation. Abdomen is flat and

soft. She has clean dressings on her lower abdominal incisions. She has a few

bowel sounds present.



LABORATORY STUDIES:

Show a white count of 7, hemoglobin 10, hematocrit 32, and a platelet count of

259,000. Differential count shows 75% neutrophils, 16% lymphocytes, and 8%

monocytes. 



Chemistry profile shows a sodium of 139, potassium 3.4, chloride 107, CO2 of

21, BUN 11, creatinine 0.5, and a glucose of 70. 



IMPRESSION: 

Patient is doing very well now, one day postoperative from a Jessica-en-Y

gastrojejunostomy for recurrent gastric outlet obstruction. 



PLAN: Patient's nasogastric (NG) tube was removed. On removal, there appeared

to be a kink in the distal tube. She tolerated this well. She will be allowed

to take some ice chips during the course of the day. She is encouraged to be up

ambulatory. I will order some oral Tylenol or Tylenol with codeine to take as

needed for pain. She will be reassessed later in the day and may be advanced to

some sips of clear liquids if she is doing well without the NG tube.

## 2021-02-05 NOTE — RO
OPERATIVE NOTE



DATE OF OPERATION: 02/04/2021





PREOPERATIVE DIAGNOSIS: Recurrent gastric outlet obstruction.



POSTOPERATIVE DIAGNOSIS: Recurrent gastric outlet obstruction.



PROCEDURE: Robotic-assisted laparoscopic Jessica-en-Y gastrojejunostomy. 



SURGEON: Morales Jameson MD 



FIRST ASSISTANT: REG Barahona. Adela's assistance was necessary in managing

the robotic instrument exchanges, passage of needles and recovery of specimens

as well as closure of the incisions. 



ANESTHESIA: General.



INDICATIONS FOR THE PROCEDURE: The patient is a 50-year-old woman with a long

history of peptic ulcer disease leading to gastric outlet obstruction. She had

presented with a complete obstruction in about July of 2020. This improved

somewhat with medical therapy but then she had persistent obstructive symptoms.

She underwent a robotic-assisted pyloroplasty with truncal vagotomy on December 22, 2020. She initially did quite well but then returned to the hospital a

month later with evidence of recurrent outlet obstruction. Endoscopy showed

some inflammation in the area of her pyloroplasty with significant angulation

in that area. She is now for a gastrojejunostomy for gastric drainage.



DESCRIPTION OF PROCEDURE: The patient was brought to the operating room and

placed on the table in a supine position. She was placed under general

endotracheal anesthesia. An orogastric tube was inserted and approximately 1600

mL of murky, brownish fluid with a suggestion of a red tint and/or coffee

ground appearance was immediately withdrawn. Her stomach which had been

palpable in her left mid-abdomen was apparently completely decompressed after

the fluid was removed. A Newsome catheter was inserted and TEDs and sequentials

were utilized. The patient's pressure points were padded. Her abdomen was

prepped and draped in a sterile fashion. Initial entry into the abdomen was in

the right upper quadrant through a trocar site from her previous surgery. 1/4%

Marcaine was infiltrated at each of the trocar sites as needed. A short

transverse incision was made and a Veress needle was inserted. After a positive

hanging drop test, the abdomen was inflated with carbon dioxide gas. A 5 mm

Visiport was placed over the scope and advanced through the abdominal wall

without difficulty. Initial examination showed a few adhesions to the area of

the pyloroplasty. The liver appeared normal. Other loops of the small and large

bowel appeared normal. The stomach appeared to be completely decompressed by

the orogastric tube. I then marked the sites for four robotic trocars across

the lower abdomen. In the right lower quadrant, a site was made for the 12 mm

port as this would be our stapler port. An 8 mm port was placed just to the

right of the midline and two were placed in the left lower quadrant. The 12 mm

port was placed first and then the three 8s were inserted. The patient was

tilted to a slight Trendelenburg position. The patient cart of the [employer] XI

robot was then brought into position. The endoscope arm was attached to the 8

mm medial right lower quadrant port. Targeting took place in the area of the

ligament of Treitz. The additional robotic arms were then docked to the

remaining ports. 



A fenestrated bipolar was placed in the 12 mm site. The SynchroSeal and a

grasping retractor were placed in the left lower quadrant ports. I then moved

to the control console to proceed with the operation. Initial inspection showed

that the stomach was completely decompressed. There was a small amount of free

serous fluid within the pelvis and upper abdomen but this was fairly minimal.

The liver appeared normal. The omentum which was quite thin was elevated above

the transverse colon. The small bowel at the ligament of Treitz was identified

and traced distally approximately 30-40 cm and all appeared healthy. Initially

the greater omentum was elevated and the omentum was freed from the transverse

colon. Dissection was continued to enter the lesser sac and expose the greater

curve and posterior wall of the stomach. Some filmy attachments in this area

were divided to clearly expose the posteroinferior wall of the stomach at its

inferior most point. I then returned to the proximal jejunum. The jejunum was

elevated and an opening was created through the mesentery approximately 20 cm

distal to the ligament of Treitz. In the course of opening this, it was that a

small segment of the jejunum had been devascularized as it turned quite dusky.



The bowel was transected with a 60 cm stapler with a blue load. The dissection

of the mesentery was continued to provide some additional freedom for

mobilization of the distal segment as the Jessica limb to come to the stomach. An

opening was created through the transverse mesocolon. The dusky end of the

small bowel was resected with a second firing of a blue load with a 60 mm

stapler. The distal portion of the jejunum was then delivered through the

transverse mesocolon into the lesser sac to lie posterior to the stomach. An

anastomosis was then created between the stomach and the small bowel. A

traction suture of 2-0 Vicryl was placed in the greater curve of the stomach to

allow this to be elevated. A small gastrotomy was created with the cauterizing

scissors and then opening was created into the antimesenteric surface of the

small bowel approximately 8 cm from the stapled end of the bowel. A 60 mm

stapler with a green load was used to create the initial anastomosis. The

residual opening was then closed with a running suture of absorbable 2-0 V-Loc.

This was carried in one layer across the closure and then sutured back in the

opposite direction to perform a second inverting closure with seromuscular

bites. Several additional 3-0 Vicryl sutures were placed to reinforce one small

area. The traction suture was removed from the stomach. Several 3-0 silk

sutures were placed to suture the edge of the transverse mesocolon to the small

bowel to try to close this defect somewhat. Intestinal continuity was then

restored by performing an anastomosis between the proximal end of the proximal

jejunum to the side of the Jessica limb approximately 30 cm distal to the

gastrojejunal anastomosis. This was accomplished with a blue load of the 60 mm

stapler. The residual opening was closed in two layers with a running suture of

absorbable 3-0 V-Loc. 



The area was then irrigated gently and inspected. There appeared to be

excellent vascularity. The staple lines all appeared excellent with excellent

suture closures of the residual defects. I elected to have anesthesia remove

the orogastric tube and replace this with a nasogastric tube. The small

devitalized segment of small bowel that had been resected was placed in a

specimen retrieval bag. All needles and instruments were accounted for. I then

returned to the patient's side. The robotic instruments were all removed and

the robot patient cart was withdrawn. Using a hand laparoscope, final

inspection was performed with a little additional irrigation and removal of any

free fluid in the abdomen. The patient was returned to a flat position. The

abdomen was deflated and the trocars were all removed. The specimen retrieval

bag was recovered through the 12 mm port in the right lower quadrant. Because

of the patient's thin build, it was possible to close the 12 mm site in layers

with 2-0 Vicryl sutures. The skin incisions were all closed with buried 4-0

Vicryl and Steri-Strips. Light dressings were applied. The patient's Newsome

catheter was removed. She tolerated the procedure well. She was awakened in the

operating room, extubated and moved to the recovery room in stable condition.

## 2021-02-06 VITALS — DIASTOLIC BLOOD PRESSURE: 62 MMHG | SYSTOLIC BLOOD PRESSURE: 121 MMHG

## 2021-02-06 VITALS — DIASTOLIC BLOOD PRESSURE: 66 MMHG | SYSTOLIC BLOOD PRESSURE: 119 MMHG

## 2021-02-06 VITALS — DIASTOLIC BLOOD PRESSURE: 68 MMHG | SYSTOLIC BLOOD PRESSURE: 119 MMHG

## 2021-02-06 VITALS — SYSTOLIC BLOOD PRESSURE: 115 MMHG | DIASTOLIC BLOOD PRESSURE: 63 MMHG

## 2021-02-06 VITALS — DIASTOLIC BLOOD PRESSURE: 56 MMHG | SYSTOLIC BLOOD PRESSURE: 104 MMHG

## 2021-02-06 VITALS — SYSTOLIC BLOOD PRESSURE: 131 MMHG | DIASTOLIC BLOOD PRESSURE: 68 MMHG

## 2021-02-06 RX ADMIN — DEXTROSE MONOHYDRATE SCH MG: 50 INJECTION, SOLUTION INTRAVENOUS at 08:59

## 2021-02-06 RX ADMIN — DEXTROSE MONOHYDRATE SCH MG: 50 INJECTION, SOLUTION INTRAVENOUS at 21:48

## 2021-02-06 RX ADMIN — SODIUM CHLORIDE, POTASSIUM CHLORIDE, SODIUM LACTATE AND CALCIUM CHLORIDE SCH MLS/HR: 600; 310; 30; 20 INJECTION, SOLUTION INTRAVENOUS at 03:26

## 2021-02-06 NOTE — IPNPDOC
Text Note


Date of Service


The patient was seen on 2/6/21.





NOTE


No acute events overnight. She is tolerating diet without any nausea or emesis. 

She is ambulating and passing flatus, no BM yet. 





VSSAF





NAD





abd - soft, TTP appropriate, incisions c/d/i





A) 51y/o female s/p gastrojejunostomy





P) d/c IVF


clq diet


ambulate


advance diet in am after a BM





Nikko Reaves DO





VS,Fishbone, I+O


VS, Fishbone, I+O





Vital Signs








  Date Time  Temp Pulse Resp B/P (MAP) Pulse Ox O2 Delivery O2 Flow Rate FiO2


 


2/6/21 10:00 98.7 85 18 104/56 (72) 98 Room Air  














I&O- Last 24 Hours up to 6 AM 


 


 2/6/21





 06:00


 


Intake Total 2290 ml


 


Output Total 1075 ml


 


Balance 1215 ml

















VISH REAVES DO             Feb 6, 2021 10:50

## 2021-02-07 VITALS — SYSTOLIC BLOOD PRESSURE: 122 MMHG | DIASTOLIC BLOOD PRESSURE: 72 MMHG

## 2021-02-07 VITALS — DIASTOLIC BLOOD PRESSURE: 59 MMHG | SYSTOLIC BLOOD PRESSURE: 127 MMHG

## 2021-02-07 VITALS — DIASTOLIC BLOOD PRESSURE: 76 MMHG | SYSTOLIC BLOOD PRESSURE: 124 MMHG

## 2021-02-07 VITALS — DIASTOLIC BLOOD PRESSURE: 70 MMHG | SYSTOLIC BLOOD PRESSURE: 124 MMHG

## 2021-02-07 RX ADMIN — DOCUSATE SODIUM,SENNOSIDES SCH TAB: 50; 8.6 TABLET, FILM COATED ORAL at 09:34

## 2021-02-07 RX ADMIN — DEXTROSE MONOHYDRATE SCH MG: 50 INJECTION, SOLUTION INTRAVENOUS at 21:40

## 2021-02-07 RX ADMIN — DOCUSATE SODIUM,SENNOSIDES SCH TAB: 50; 8.6 TABLET, FILM COATED ORAL at 21:40

## 2021-02-07 RX ADMIN — DEXTROSE MONOHYDRATE SCH MG: 50 INJECTION, SOLUTION INTRAVENOUS at 09:34

## 2021-02-07 NOTE — IPNPDOC
Text Note


Date of Service


The patient was seen on 2/7/21.





NOTE


No acute events overnight. She is tolerating diet without any nausea or emesis. 

She is ambulating and passing flatus, but still no BM yet. 





VSSAF





NAD





abd - soft, TTP appropriate, incisions c/d/i





A) 51y/o female s/p gastrojejunostomy





P) full liquid diet


ambulate


will likely advance diet in am and d/c home after a BM. 





Nikko Reaves DO





VS,Fishbone, I+O


VS, Fishbone, I+O





Vital Signs








  Date Time  Temp Pulse Resp B/P (MAP) Pulse Ox O2 Delivery O2 Flow Rate FiO2


 


2/7/21 06:00 97.5 82 20 122/72 (89) 96   


 


2/6/21 18:00      Room Air  














I&O- Last 24 Hours up to 6 AM 


 


 2/7/21





 06:00


 


Intake Total 2940 ml


 


Output Total 800 ml


 


Balance 2140 ml

















VISH REAVES DO             Feb 7, 2021 08:09

## 2021-02-08 VITALS — SYSTOLIC BLOOD PRESSURE: 133 MMHG | DIASTOLIC BLOOD PRESSURE: 73 MMHG

## 2021-02-08 VITALS — SYSTOLIC BLOOD PRESSURE: 106 MMHG | DIASTOLIC BLOOD PRESSURE: 66 MMHG

## 2021-02-08 VITALS — SYSTOLIC BLOOD PRESSURE: 126 MMHG | DIASTOLIC BLOOD PRESSURE: 60 MMHG

## 2021-02-08 LAB
ALBUMIN SERPL BCG-MCNC: 2.9 GM/DL (ref 3.2–5.2)
ALT SERPL W P-5'-P-CCNC: 25 U/L (ref 12–78)
BILIRUB SERPL-MCNC: 0.5 MG/DL (ref 0.2–1)
BUN SERPL-MCNC: 4 MG/DL (ref 7–18)
CALCIUM SERPL-MCNC: 8.5 MG/DL (ref 8.5–10.1)
CHLORIDE SERPL-SCNC: 102 MEQ/L (ref 98–107)
CO2 SERPL-SCNC: 34 MEQ/L (ref 21–32)
CREAT SERPL-MCNC: 0.45 MG/DL (ref 0.55–1.3)
GFR SERPL CREATININE-BSD FRML MDRD: > 60 ML/MIN/{1.73_M2} (ref 51–?)
GLUCOSE SERPL-MCNC: 97 MG/DL (ref 70–100)
HCT VFR BLD AUTO: 30.6 % (ref 36–47)
HGB BLD-MCNC: 10.2 G/DL (ref 12–15.5)
MCH RBC QN AUTO: 29 PG (ref 27–33)
MCHC RBC AUTO-ENTMCNC: 33.3 G/DL (ref 32–36.5)
MCV RBC AUTO: 86.9 FL (ref 80–96)
PLATELET # BLD AUTO: 242 10^3/UL (ref 150–450)
POTASSIUM SERPL-SCNC: 3.2 MEQ/L (ref 3.5–5.1)
PROT SERPL-MCNC: 6 GM/DL (ref 6.4–8.2)
RBC # BLD AUTO: 3.52 10^6/UL (ref 4–5.4)
SODIUM SERPL-SCNC: 143 MEQ/L (ref 136–145)
WBC # BLD AUTO: 4.2 10^3/UL (ref 4–10)

## 2021-02-08 RX ADMIN — DOCUSATE SODIUM,SENNOSIDES SCH TAB: 50; 8.6 TABLET, FILM COATED ORAL at 09:00

## 2021-02-08 RX ADMIN — DEXTROSE MONOHYDRATE SCH MG: 50 INJECTION, SOLUTION INTRAVENOUS at 09:30

## 2021-02-08 NOTE — DS.PDOC
Discharge Summary


General


Date of Admission


Feb 4, 2021 at 06:12


Date of Discharge


2/8/21


Attending Physician:  Morales Jameson





Discharge Summary


PROCEDURES PERFORMED DURING STAY: Robotic-assisted laparoscopic Jessica-en-Y 

gastrojejunostomy as per Dr Jameson 2/4/21.





ADMITTING DIAGNOSES: 


Peptic ulcer disease with gastric obstruction





DISCHARGE DIAGNOSES:


Peptic ulcer disease with gastric obstruction S/P Robotic-assisted laparoscopic 

Jessica-en-Y gastrojejunostomy as per Dr Jameson 2/4/21.








HISTORY OF PRESENT ILLNESS: The patient is a 50-year-old female with long-

standing history of peptic ulcer disease leading to gastric outlet obstruction. 

The patient presented with a complete obstruction in July 2020 with persistent 

obstructive symptoms despite medical therapy. The patient underwent RA 

pyloroplasty with truncal vagotomy 12/22/2020 as per Dr Jameson. However she 

returned to the hospital a month later with evidence of recurrent outlet 

obstruction. The patient was subsequently recommended to proceed with RA 

laparoscopic Jessica-en-Y gastrojejunostomy as per Dr Jameson. 





HOSPITAL COURSE: The pt is s/p Robotic-assisted laparoscopic Jessica-en-Y 

gastrojejunostomy as per Dr Jameson 2/4/21. The patient tolerated the procedure 

well and has not reported any significant pain. No nausea or vomiting. By 

postoperative day one the patient's NG tube was removed. She was advanced to 

clear liquids which she tolerated well. IV fluids were discontinued 2/6 and 2/7 

the patient was advanced to full liquids. She was out of bed and ambulating. 

Patient remained afebrile throughout her stay with no leukocytosis. On the 

morning of 2/8/21 the patient was examined and reviewed by Dr. Jameson. Although

the patient was noted to have some mild abdominal distention in the LUQ, she had

had 1780cc liquids over the past 24 hours with no obstructive symptoms, the 

patient was not reporting any nausea or vomiting, no abdominal pain or 

epigastric pain.  The patient did have a bowel movement on 2/8/21. Patient 

remained afebrile throughout her stay. On the day of discharge the patient's 

hemoglobin was noted to be 10.2 which is stable from admission. Potassium was 

noted to be 3.2, this is also her baseline of 3.2-3.4. The patient was felt 

stable for discharge with recommendation to continue full liquids until follow-

up in the office with Dr. Jameson.





DISCHARGE MEDICATIONS: Please see below.


 


ALLERGIES: Please see below.





PHYSICAL EXAMINATION ON DISCHARGE:


VITAL SIGNS: Please see below.


GENERAL: No acute distress, out of bed to the chair. Ambulating in the room.


HEENT: Moist mucous membranes


CARDIOVASCULAR EXAMINATION: Regular rate and rhythm.


RESPIRATORY EXAMINATION: Clear to auscultation


ABDOMINAL EXAMINATION: Soft, some mild distention was noted LUQ, BS present,  

Steri-Strips intact over her surgical sites. No erythema, drainage or signs of 

infection. 


EXTREMITIES: Trace edema noted around the ankles bilaterally.








LABORATORY DATA: Please see below.





DISCHARGE INSTRUCTIONS:


Discharge home


Light activity as tolerated


Continue full liquids until follow-up with Dr Jameson in 1 week. The patient is 

advised to eat frequent small meals throughout the day. 


The patient is advised to call back to the office with nausea, vomiting, 

increasing abdominal distention or abdominal or epigastric pain.


Continue Protonix 40 mg by mouth twice a day.


Okay to shower, Keep surgical sites clean and dry, allow Steri-Strips to fall 

off on their own.





DISCHARGE CONDITION: Stable.





TIME SPENT ON DISCHARGE: Greater than 30 minutes.





Vital Signs/I&Os





Vital Signs








  Date Time  Temp Pulse Resp B/P (MAP) Pulse Ox O2 Delivery O2 Flow Rate FiO2


 


2/8/21 10:00 98.3 113 16 106/66 (79) 98 Room Air  














I&O- Last 24 Hours up to 6 AM 


 


 2/8/21





 05:59


 


Intake Total 1930 ml


 


Output Total 2275 ml


 


Balance -345 ml











Laboratory Data


Labs 24H


Laboratory Tests 2


2/8/21 06:16: 


Nucleated Red Blood Cells % (auto) 0.0, Anion Gap 7L, Glomerular Filtration Rate

> 60.0, Calcium Level 8.5, Total Bilirubin 0.5, Aspartate Amino Transf 

(AST/SGOT) 22, Alanine Aminotransferase (ALT/SGPT) 25, Alkaline Phosphatase 50, 

Total Protein 6.0L, Albumin 2.9L, Albumin/Globulin Ratio 0.9L


CBC/BMP


Laboratory Tests


2/8/21 06:16











Discharge Medications


Scheduled


Pantoprazole Sodium (Pantoprazole Sodium) 40 Mg Tablet., 40 MG PO BID, 

(Reported)





Scheduled PRN


Acetaminophen (Acetaminophen) 325 Mg Tablet, 650 MG PO Q6HP PRN for MILD PAIN or

TEMP > 100.4





Allergies


Coded Allergies:  


     No Known Allergies (Unverified , 2/2/21)





Attending Note


Attending Note


Doing well on morning of 2/8.  I saw patient with Marnie Jamil.  Wounds clean 

and dry.  Abdomen mildly full on left but with active BS and soft and without 

undue tenderness.  Has tolerated significant fluids.  Having flatus.  Patient 

counselled to call for any problems.  Remain on full liquids and eat small amts 

throughout the day.











Marnie Jamil               Feb 8, 2021 14:43


Morales Jameson                  Feb 8, 2021 19:46

## 2021-05-30 ENCOUNTER — HOSPITAL ENCOUNTER (OUTPATIENT)
Dept: HOSPITAL 53 - M LABSMTC | Age: 51
End: 2021-05-30
Attending: ANESTHESIOLOGY
Payer: COMMERCIAL

## 2021-05-30 DIAGNOSIS — Z11.52: ICD-10-CM

## 2021-05-30 DIAGNOSIS — Z01.818: Primary | ICD-10-CM

## 2021-06-04 ENCOUNTER — HOSPITAL ENCOUNTER (OUTPATIENT)
Dept: HOSPITAL 53 - M OPP | Age: 51
Discharge: HOME | End: 2021-06-04
Attending: INTERNAL MEDICINE
Payer: COMMERCIAL

## 2021-06-04 VITALS — BODY MASS INDEX: 20.2 KG/M2 | HEIGHT: 61 IN | WEIGHT: 107 LBS

## 2021-06-04 VITALS — DIASTOLIC BLOOD PRESSURE: 62 MMHG | SYSTOLIC BLOOD PRESSURE: 118 MMHG

## 2021-06-04 DIAGNOSIS — K31.1: ICD-10-CM

## 2021-06-04 DIAGNOSIS — K91.89: ICD-10-CM

## 2021-06-04 DIAGNOSIS — Z79.899: ICD-10-CM

## 2021-06-04 DIAGNOSIS — K64.8: ICD-10-CM

## 2021-06-04 DIAGNOSIS — K31.89: ICD-10-CM

## 2021-06-04 DIAGNOSIS — Z12.11: Primary | ICD-10-CM

## 2021-06-04 DIAGNOSIS — Q43.8: ICD-10-CM

## 2021-06-04 DIAGNOSIS — Z98.0: ICD-10-CM

## 2021-06-04 PROCEDURE — 43235 EGD DIAGNOSTIC BRUSH WASH: CPT

## 2021-06-04 PROCEDURE — 45378 DIAGNOSTIC COLONOSCOPY: CPT

## 2021-06-04 NOTE — ROOR
________________________________________________________________________________

Patient Name: Sade Sebastian             Procedure Date: 6/4/2021 11:10 AM

MRN: [id number]                          Account Number: [id number]

YOB: 1970                Age: 51

Room: [facility]                            Gender: Female

Note Status: Finalized                 

________________________________________________________________________________

 

Procedure:            Colonoscopy

Indications:          Screening for colorectal malignant neoplasm

Providers:            Johnny Weems MD

Referring MD:         SUSANNE SANTILLAN MD

Requesting Provider:  

Medicines:            Monitored Anesthesia Care

Complications:        No immediate complications.

________________________________________________________________________________

Procedure:            Pre-Anesthesia Assessment:

                      - Prior to the procedure, a History and Physical was 

                      performed, and patient medications and allergies were 

                      reviewed. The patient is competent. The risks and 

                      benefits of the procedure and the sedation options and 

                      risks were discussed with the patient. All questions 

                      were answered and informed consent was obtained. Patient 

                      identification and proposed procedure were verified by 

                      the physician, the nurse and the anesthesiologist in the 

                      procedure room. Mental Status Examination: alert and 

                      oriented. Airway Examination: normal oropharyngeal 

                      airway and neck mobility. Respiratory Examination: clear 

                      to auscultation. CV Examination: normal. Prophylactic 

                      Antibiotics: The patient does not require prophylactic 

                      antibiotics. Prior Anticoagulants: The patient has taken 

                      no previous anticoagulant or antiplatelet agents. ASA 

                      Grade Assessment: II - A patient with mild systemic 

                      disease. After reviewing the risks and benefits, the 

                      patient was deemed in satisfactory condition to undergo 

                      the procedure. The anesthesia plan was to use monitored 

                      anesthesia care (MAC). Immediately prior to 

                      administration of medications, the patient was 

                      re-assessed for adequacy to receive sedatives. The heart 

                      rate, respiratory rate, oxygen saturations, blood 

                      pressure, adequacy of pulmonary ventilation, and 

                      response to care were monitored throughout the 

                      procedure. The physical status of the patient was 

                      re-assessed after the procedure.

                      The Colonoscope was introduced through the anus and 

                      advanced to the terminal ileum, with identification of 

                      the appendiceal orifice and IC valve. The colonoscopy 

                      was performed without difficulty. The patient tolerated 

                      the procedure well. The quality of the bowel preparation 

                      was good. The terminal ileum, ileocecal valve, 

                      appendiceal orifice, and rectum were photographed. Scope 

                      insertion time was 3 minutes. Scope withdrawal time was 

                      8 minutes. The total duration of the procedure was 11 

                      minutes.

                                                                                

Findings:

     The perianal and digital rectal examinations were normal.

     The terminal ileum appeared normal.

     The left colon was moderately tortuous.

     Non-bleeding external and internal hemorrhoids were found during 

     retroflexion. The hemorrhoids were medium-sized.

     No other significant abnormalities were identified in a careful 

     examination of the remainder of the colon.

                                                                                

Impression:           - The examined portion of the ileum was normal.

                      - Tortuous colon.

                      - Non-bleeding external and internal hemorrhoids.

                      - No specimens collected.

Recommendation:       - Patient has a contact number available for 

                      emergencies. The signs and symptoms of potential delayed 

                      complications were discussed with the patient. Return to 

                      normal activities tomorrow. Written discharge 

                      instructions were provided to the patient.

                      - High fiber diet.

                      - Continue present medications.

                      - Repeat colonoscopy in 10 years for screening purposes.

                      - Return to GI clinic if persistent symptoms or new 

                      symptoms.

                      - Return to primary care physician.

                                                                                

Procedure Code(s):    --- Professional ---

                      51379, Colonoscopy, flexible; diagnostic, including 

                      collection of specimen(s) by brushing or washing, when 

                      performed (separate procedure)

Diagnosis Code(s):    --- Professional ---

                      Z12.11, Encounter for screening for malignant neoplasm 

                      of colon

                      K64.8, Other hemorrhoids

                      Q43.8, Other specified congenital malformations of 

                      intestine

 

CPT copyright 2019 [employer]. All rights reserved.

 

The codes documented in this report are preliminary and upon  review may 

be revised to meet current compliance requirements.

 

Johnny Weems MD

_______________________

Johnny Weems MD

6/4/2021 12:29:36 PM

Electronically signed by Johnny Weems MD

Number of Addenda: 0

 

Note Initiated On: 6/4/2021 11:10 AM

Estimated Blood Loss: Estimated blood loss was minimal.

## 2021-06-04 NOTE — ROOR
________________________________________________________________________________

Patient Name: Sade Sebastian             Procedure Date: 6/4/2021 11:08 AM

MRN: [id number]                          Account Number: [id number]

YOB: 1970                Age: 51

Room: [facility]                            Gender: Female

Note Status: Finalized                 

________________________________________________________________________________

 

Procedure:            Upper GI endoscopy

Indications:          Follow-up of pyloric stenosis

Providers:            Johnny Weems MD

Referring MD:         SUSANNE SANTILLAN MD

Requesting Provider:  

Medicines:            Monitored Anesthesia Care

Complications:        No immediate complications.

________________________________________________________________________________

Procedure:            Pre-Anesthesia Assessment:

                      - Prior to the procedure, a History and Physical was 

                      performed, and patient medications and allergies were 

                      reviewed. The patient is competent. The risks and 

                      benefits of the procedure and the sedation options and 

                      risks were discussed with the patient. All questions 

                      were answered and informed consent was obtained. Patient 

                      identification and proposed procedure were verified by 

                      the physician, the nurse and the anesthesiologist in the 

                      procedure room. Mental Status Examination: alert and 

                      oriented. Airway Examination: normal oropharyngeal 

                      airway and neck mobility. Respiratory Examination: clear 

                      to auscultation. CV Examination: normal. Prophylactic 

                      Antibiotics: The patient does not require prophylactic 

                      antibiotics. Prior Anticoagulants: The patient has taken 

                      no previous anticoagulant or antiplatelet agents. ASA 

                      Grade Assessment: II - A patient with mild systemic 

                      disease. After reviewing the risks and benefits, the 

                      patient was deemed in satisfactory condition to undergo 

                      the procedure. The anesthesia plan was to use monitored 

                      anesthesia care (MAC). Immediately prior to 

                      administration of medications, the patient was 

                      re-assessed for adequacy to receive sedatives. The heart 

                      rate, respiratory rate, oxygen saturations, blood 

                      pressure, adequacy of pulmonary ventilation, and 

                      response to care were monitored throughout the 

                      procedure. The physical status of the patient was 

                      re-assessed after the procedure.

                      The Endoscope was introduced through the mouth, and 

                      advanced to the second part of duodenum. The upper GI 

                      endoscopy was accomplished without difficulty. The 

                      patient tolerated the procedure well.

                                                                                

Findings:

     The examined esophagus was normal.

     The Z-line was regular and was found at the gastroesophageal junction.

     Evidence of a gastrojejunostomy was found in the gastric body (greater 

     curvature). This was characterized by congestion and an intact staple 

     line.

     A deformity was found at the pylorus and at the pyloroplasty ( likely 

     from prior healed ulcer).

     Normal mucosa was found in the second portion of the duodenum, in the 

     third portion of the duodenum and in the fourth portion of the duodenum.

     Normal mucosa was found in the jejunum.

                                                                                

Impression:           - Normal esophagus.

                      - Z-line regular, at the gastroesophageal junction.

                      - A gastrojejunostomy was found, characterized by 

                      congestion and an intact staple line.

                      - Deformity in the pylorus and in the pyloroplasty 

                      (likely from prior healed ulcer).

                      - Normal mucosa was found in the second portion of the 

                      duodenum, in the third portion of the duodenum and in 

                      the fourth portion of the duodenum.

                      - Normal mucosa was found in the jejunum.

                      - No specimens collected.

Recommendation:       - Patient has a contact number available for 

                      emergencies. The signs and symptoms of potential delayed 

                      complications were discussed with the patient. Return to 

                      normal activities tomorrow. Written discharge 

                      instructions were provided to the patient.

                      - High fiber diet.

                      - Continue present medications.

                      - No ibuprofen, naproxen, or other non-steroidal 

                      anti-inflammatory drugs.

                      - Return to GI clinic if persistent symptoms or new 

                      symptoms.

                      - Return to primary care physician.

                                                                                

Procedure Code(s):    --- Professional ---

                      59030, Esophagogastroduodenoscopy, flexible, transoral; 

                      diagnostic, including collection of specimen(s) by 

                      brushing or washing, when performed (separate procedure)

Diagnosis Code(s):    --- Professional ---

                      Z98.0, Intestinal bypass and anastomosis status

                      K31.89, Other diseases of stomach and duodenum

                      K91.89, Other postprocedural complications and disorders 

                      of digestive system

                      K31.1, Adult hypertrophic pyloric stenosis

 

CPT copyright 2019 [employer]. All rights reserved.

 

The codes documented in this report are preliminary and upon  review may 

be revised to meet current compliance requirements.

 

Johnny Weems MD

_______________________

Johnny Weems MD

6/4/2021 12:06:36 PM

Electronically signed by Johnny Weems MD

Number of Addenda: 0

 

Note Initiated On: 6/4/2021 11:08 AM

Estimated Blood Loss: Estimated blood loss: none.